# Patient Record
Sex: MALE | Race: WHITE
[De-identification: names, ages, dates, MRNs, and addresses within clinical notes are randomized per-mention and may not be internally consistent; named-entity substitution may affect disease eponyms.]

---

## 2017-05-15 ENCOUNTER — HOSPITAL ENCOUNTER (OUTPATIENT)
Dept: HOSPITAL 83 - LAB | Age: 82
Discharge: HOME | End: 2017-05-15
Attending: FAMILY MEDICINE
Payer: MEDICARE

## 2017-05-15 DIAGNOSIS — E11.9: Primary | ICD-10-CM

## 2017-05-15 DIAGNOSIS — I10: ICD-10-CM

## 2017-05-15 LAB
ALBUMIN SERPL-MCNC: 3.8 GM/DL (ref 3.1–4.5)
ALP SERPL-CCNC: 55 U/L (ref 45–117)
ALT SERPL W P-5'-P-CCNC: 19 U/L (ref 12–78)
AST SERPL-CCNC: 13 IU/L (ref 3–35)
BASOPHILS # BLD AUTO: 0 10*3/UL (ref 0–0.1)
BASOPHILS NFR BLD AUTO: 0.6 % (ref 0–1)
BUN SERPL-MCNC: 17 MG/DL (ref 7–24)
CHLORIDE SERPL-SCNC: 104 MMOL/L (ref 98–107)
CO2 SERPL-SCNC: 34 MMOL/L (ref 21–32)
EOSINOPHIL # BLD AUTO: 0.3 10*3/UL (ref 0–0.4)
EOSINOPHIL # BLD AUTO: 5 % (ref 1–4)
ERYTHROCYTE [DISTWIDTH] IN BLOOD BY AUTOMATED COUNT: 14.4 % (ref 0–14.5)
EST. AVERAGE GLUCOSE BLD GHB EST-MCNC: 140 MG/DL
GLUCOSE SERPL-MCNC: 114 MG/DL (ref 65–99)
HCT VFR BLD AUTO: 44.2 % (ref 42–52)
HGB BLD-MCNC: 14.1 G/DL (ref 14–18)
IG #: 0 10*3/UL (ref 0–0.1)
LYMPHOCYTES # BLD AUTO: 1.3 10*3/UL (ref 1.3–4.4)
LYMPHOCYTES NFR BLD AUTO: 21.4 % (ref 27–41)
MCH RBC QN AUTO: 28.4 PG (ref 27–31)
MCHC RBC AUTO-ENTMCNC: 31.9 G/DL (ref 33–37)
MCV RBC AUTO: 88.9 FL (ref 80–94)
MONOCYTES # BLD AUTO: 0.7 10*3/UL (ref 0.1–1)
MONOCYTES NFR BLD MANUAL: 10.5 % (ref 3–9)
NEUT #: 3.8 10*3/UL (ref 2.3–7.9)
NEUT %: 62.3 % (ref 47–73)
NRBC BLD QL AUTO: 0 % (ref 0–0)
PLATELET # BLD AUTO: 157 10*3/UL (ref 130–400)
PMV BLD AUTO: 10 FL (ref 9.6–12.3)
POTASSIUM SERPL-SCNC: 4.1 MMOL/L (ref 3.5–5.1)
PROT SERPL-MCNC: 7 GM/DL (ref 6.4–8.2)
RBC # BLD AUTO: 4.97 10*6/UL (ref 4.5–5.9)
SODIUM SERPL-SCNC: 143 MMOL/L (ref 136–145)
WBC NRBC COR # BLD AUTO: 6.2 10*3/UL (ref 4.8–10.8)

## 2017-05-19 ENCOUNTER — HOSPITAL ENCOUNTER (OUTPATIENT)
Dept: HOSPITAL 83 - LAB | Age: 82
Discharge: HOME | End: 2017-05-19
Attending: FAMILY MEDICINE
Payer: MEDICARE

## 2017-05-19 DIAGNOSIS — M25.78: ICD-10-CM

## 2017-05-19 DIAGNOSIS — I70.0: ICD-10-CM

## 2017-05-19 DIAGNOSIS — R53.83: ICD-10-CM

## 2017-05-19 DIAGNOSIS — M47.896: Primary | ICD-10-CM

## 2017-05-19 LAB
T4 SERPL-MCNC: 7.5 UG/DL (ref 4.5–12.1)
TSH SERPL DL<=0.005 MIU/L-ACNC: 0.68 UIU/ML (ref 0.36–4.75)

## 2017-07-13 ENCOUNTER — HOSPITAL ENCOUNTER (OUTPATIENT)
Dept: HOSPITAL 83 - MRI | Age: 82
Discharge: HOME | End: 2017-07-13
Attending: FAMILY MEDICINE
Payer: MEDICARE

## 2017-07-13 DIAGNOSIS — M54.16: ICD-10-CM

## 2017-07-13 DIAGNOSIS — M47.897: ICD-10-CM

## 2017-07-13 DIAGNOSIS — M48.06: Primary | ICD-10-CM

## 2017-10-26 ENCOUNTER — HOSPITAL ENCOUNTER (OUTPATIENT)
Dept: HOSPITAL 83 - LAB | Age: 82
Discharge: HOME | End: 2017-10-26
Attending: FAMILY MEDICINE
Payer: MEDICARE

## 2017-10-26 DIAGNOSIS — E78.00: ICD-10-CM

## 2017-10-26 DIAGNOSIS — E11.9: Primary | ICD-10-CM

## 2017-10-26 LAB
ALBUMIN SERPL-MCNC: 3.7 GM/DL (ref 3.1–4.5)
ALP SERPL-CCNC: 66 U/L (ref 45–117)
ALT SERPL W P-5'-P-CCNC: 22 U/L (ref 12–78)
AST SERPL-CCNC: 15 IU/L (ref 3–35)
BASOPHILS # BLD AUTO: 0.1 10*3/UL (ref 0–0.1)
BASOPHILS NFR BLD AUTO: 0.9 % (ref 0–1)
BUN SERPL-MCNC: 14 MG/DL (ref 7–24)
CHLORIDE SERPL-SCNC: 100 MMOL/L (ref 98–107)
CHOLEST SERPL-MCNC: 125 MG/DL (ref ?–200)
CREAT SERPL-MCNC: 0.98 MG/DL (ref 0.7–1.3)
EOSINOPHIL # BLD AUTO: 0.3 10*3/UL (ref 0–0.4)
EOSINOPHIL # BLD AUTO: 4.9 % (ref 1–4)
ERYTHROCYTE [DISTWIDTH] IN BLOOD BY AUTOMATED COUNT: 14.2 % (ref 0–14.5)
HCT VFR BLD AUTO: 45 % (ref 42–52)
HDLC SERPL-MCNC: 38 MG/DL (ref 40–60)
HGB BLD-MCNC: 14.4 G/DL (ref 14–18)
LDLC SERPL DIRECT ASSAY-MCNC: 54 MG/DL (ref 9–159)
LYMPHOCYTES # BLD AUTO: 1.2 10*3/UL (ref 1.3–4.4)
LYMPHOCYTES NFR BLD AUTO: 20 % (ref 27–41)
MCH RBC QN AUTO: 28.9 PG (ref 27–31)
MCHC RBC AUTO-ENTMCNC: 32 G/DL (ref 33–37)
MCV RBC AUTO: 90.4 FL (ref 80–94)
MONOCYTES # BLD AUTO: 0.5 10*3/UL (ref 0.1–1)
MONOCYTES NFR BLD MANUAL: 8.7 % (ref 3–9)
NEUT #: 3.7 10*3/UL (ref 2.3–7.9)
NEUT %: 65 % (ref 47–73)
NRBC BLD QL AUTO: 0 10*3/UL (ref 0–0)
PLATELET # BLD AUTO: 167 10*3/UL (ref 130–400)
PMV BLD AUTO: 10.1 FL (ref 9.6–12.3)
POTASSIUM SERPL-SCNC: 4.7 MMOL/L (ref 3.5–5.1)
PROT SERPL-MCNC: 7.5 GM/DL (ref 6.4–8.2)
RBC # BLD AUTO: 4.98 10*6/UL (ref 4.5–5.9)
SODIUM SERPL-SCNC: 139 MMOL/L (ref 136–145)
TRIGL SERPL-MCNC: 165 MG/DL (ref ?–150)
VLDLC SERPL CALC-MCNC: 33 MG/DL (ref 6–40)
WBC NRBC COR # BLD AUTO: 5.7 10*3/UL (ref 4.8–10.8)

## 2018-01-25 PROBLEM — I35.8 AORTIC VALVE SCLEROSIS: Status: ACTIVE | Noted: 2018-01-25

## 2018-04-20 ENCOUNTER — HOSPITAL ENCOUNTER (OUTPATIENT)
Dept: HOSPITAL 83 - WOUNDCARE | Age: 83
Discharge: HOME | End: 2018-04-20
Attending: NURSE PRACTITIONER
Payer: MEDICARE

## 2018-04-20 DIAGNOSIS — Z87.891: ICD-10-CM

## 2018-04-20 DIAGNOSIS — Z90.49: ICD-10-CM

## 2018-04-20 DIAGNOSIS — E10.622: Primary | ICD-10-CM

## 2018-04-20 DIAGNOSIS — Z98.41: ICD-10-CM

## 2018-04-20 DIAGNOSIS — I10: ICD-10-CM

## 2018-04-20 DIAGNOSIS — L97.311: ICD-10-CM

## 2018-04-20 DIAGNOSIS — E78.00: ICD-10-CM

## 2018-04-20 DIAGNOSIS — Z98.42: ICD-10-CM

## 2018-04-27 ENCOUNTER — HOSPITAL ENCOUNTER (OUTPATIENT)
Dept: HOSPITAL 83 - WOUNDCARE | Age: 83
Discharge: HOME | End: 2018-04-27
Attending: NURSE PRACTITIONER
Payer: MEDICARE

## 2018-04-27 DIAGNOSIS — I10: ICD-10-CM

## 2018-04-27 DIAGNOSIS — Z90.49: ICD-10-CM

## 2018-04-27 DIAGNOSIS — L97.311: ICD-10-CM

## 2018-04-27 DIAGNOSIS — Z87.891: ICD-10-CM

## 2018-04-27 DIAGNOSIS — Z98.41: ICD-10-CM

## 2018-04-27 DIAGNOSIS — E10.622: Primary | ICD-10-CM

## 2018-04-27 DIAGNOSIS — Z98.42: ICD-10-CM

## 2018-04-27 DIAGNOSIS — E78.00: ICD-10-CM

## 2018-05-07 ENCOUNTER — HOSPITAL ENCOUNTER (OUTPATIENT)
Dept: HOSPITAL 83 - WOUNDCARE | Age: 83
Discharge: HOME | End: 2018-05-07
Attending: NURSE PRACTITIONER
Payer: MEDICARE

## 2018-05-07 DIAGNOSIS — Z98.41: ICD-10-CM

## 2018-05-07 DIAGNOSIS — Z98.42: ICD-10-CM

## 2018-05-07 DIAGNOSIS — E10.622: Primary | ICD-10-CM

## 2018-05-07 DIAGNOSIS — I10: ICD-10-CM

## 2018-05-07 DIAGNOSIS — Z90.49: ICD-10-CM

## 2018-05-07 DIAGNOSIS — L97.311: ICD-10-CM

## 2018-05-07 DIAGNOSIS — E78.00: ICD-10-CM

## 2018-05-07 DIAGNOSIS — Z87.891: ICD-10-CM

## 2018-05-14 ENCOUNTER — HOSPITAL ENCOUNTER (OUTPATIENT)
Dept: HOSPITAL 83 - WOUNDCARE | Age: 83
Discharge: HOME | End: 2018-05-14
Attending: NURSE PRACTITIONER
Payer: MEDICARE

## 2018-05-14 DIAGNOSIS — Z90.49: ICD-10-CM

## 2018-05-14 DIAGNOSIS — E10.622: Primary | ICD-10-CM

## 2018-05-14 DIAGNOSIS — Z87.891: ICD-10-CM

## 2018-05-14 DIAGNOSIS — Z98.42: ICD-10-CM

## 2018-05-14 DIAGNOSIS — Z98.41: ICD-10-CM

## 2018-05-14 DIAGNOSIS — E78.00: ICD-10-CM

## 2018-05-14 DIAGNOSIS — I10: ICD-10-CM

## 2018-05-14 DIAGNOSIS — L97.311: ICD-10-CM

## 2018-05-22 ENCOUNTER — HOSPITAL ENCOUNTER (OUTPATIENT)
Dept: HOSPITAL 83 - WOUNDCARE | Age: 83
Discharge: HOME | End: 2018-05-22
Attending: NURSE PRACTITIONER
Payer: MEDICARE

## 2018-05-22 DIAGNOSIS — Z87.891: ICD-10-CM

## 2018-05-22 DIAGNOSIS — E10.622: Primary | ICD-10-CM

## 2018-05-22 DIAGNOSIS — Z98.41: ICD-10-CM

## 2018-05-22 DIAGNOSIS — L97.311: ICD-10-CM

## 2018-05-22 DIAGNOSIS — Z98.42: ICD-10-CM

## 2018-05-22 DIAGNOSIS — Z90.49: ICD-10-CM

## 2018-05-22 DIAGNOSIS — E78.00: ICD-10-CM

## 2018-05-22 DIAGNOSIS — I10: ICD-10-CM

## 2018-05-30 ENCOUNTER — HOSPITAL ENCOUNTER (OUTPATIENT)
Dept: HOSPITAL 83 - WOUNDCARE | Age: 83
Discharge: HOME | End: 2018-05-30
Attending: PODIATRIST
Payer: MEDICARE

## 2018-05-30 DIAGNOSIS — Z98.41: ICD-10-CM

## 2018-05-30 DIAGNOSIS — E10.622: Primary | ICD-10-CM

## 2018-05-30 DIAGNOSIS — I10: ICD-10-CM

## 2018-05-30 DIAGNOSIS — L97.311: ICD-10-CM

## 2018-05-30 DIAGNOSIS — E78.00: ICD-10-CM

## 2018-05-30 DIAGNOSIS — Z98.42: ICD-10-CM

## 2018-05-30 DIAGNOSIS — Z87.891: ICD-10-CM

## 2018-05-30 DIAGNOSIS — Z90.49: ICD-10-CM

## 2018-06-07 ENCOUNTER — HOSPITAL ENCOUNTER (OUTPATIENT)
Dept: HOSPITAL 83 - WOUNDCARE | Age: 83
Discharge: HOME | End: 2018-06-07
Attending: NURSE PRACTITIONER
Payer: MEDICARE

## 2018-06-07 DIAGNOSIS — I10: ICD-10-CM

## 2018-06-07 DIAGNOSIS — E10.59: ICD-10-CM

## 2018-06-07 DIAGNOSIS — E78.00: ICD-10-CM

## 2018-06-07 DIAGNOSIS — L97.311: ICD-10-CM

## 2018-06-07 DIAGNOSIS — Z87.891: ICD-10-CM

## 2018-06-07 DIAGNOSIS — E10.36: ICD-10-CM

## 2018-06-07 DIAGNOSIS — E10.622: Primary | ICD-10-CM

## 2018-06-14 ENCOUNTER — HOSPITAL ENCOUNTER (OUTPATIENT)
Dept: HOSPITAL 83 - WOUNDCARE | Age: 83
Discharge: HOME | End: 2018-06-14
Attending: NURSE PRACTITIONER
Payer: MEDICARE

## 2018-06-14 DIAGNOSIS — E78.00: ICD-10-CM

## 2018-06-14 DIAGNOSIS — Z87.891: ICD-10-CM

## 2018-06-14 DIAGNOSIS — Z90.49: ICD-10-CM

## 2018-06-14 DIAGNOSIS — Z98.42: ICD-10-CM

## 2018-06-14 DIAGNOSIS — L97.311: ICD-10-CM

## 2018-06-14 DIAGNOSIS — E10.622: Primary | ICD-10-CM

## 2018-06-14 DIAGNOSIS — I10: ICD-10-CM

## 2018-06-14 DIAGNOSIS — Z98.41: ICD-10-CM

## 2018-06-21 ENCOUNTER — HOSPITAL ENCOUNTER (OUTPATIENT)
Dept: HOSPITAL 83 - WOUNDCARE | Age: 83
Discharge: HOME | End: 2018-06-21
Attending: NURSE PRACTITIONER
Payer: MEDICARE

## 2018-06-21 DIAGNOSIS — Z95.5: ICD-10-CM

## 2018-06-21 DIAGNOSIS — L97.311: ICD-10-CM

## 2018-06-21 DIAGNOSIS — E11.622: Primary | ICD-10-CM

## 2018-06-21 DIAGNOSIS — E78.00: ICD-10-CM

## 2018-06-21 DIAGNOSIS — Z98.42: ICD-10-CM

## 2018-06-21 DIAGNOSIS — Z87.891: ICD-10-CM

## 2018-06-21 DIAGNOSIS — E11.59: ICD-10-CM

## 2018-06-21 DIAGNOSIS — Z98.41: ICD-10-CM

## 2018-06-21 DIAGNOSIS — I10: ICD-10-CM

## 2018-06-27 ENCOUNTER — HOSPITAL ENCOUNTER (OUTPATIENT)
Dept: HOSPITAL 83 - WOUNDCARE | Age: 83
Discharge: HOME | End: 2018-06-27
Attending: NURSE PRACTITIONER
Payer: MEDICARE

## 2018-06-27 DIAGNOSIS — E11.59: ICD-10-CM

## 2018-06-27 DIAGNOSIS — L97.311: ICD-10-CM

## 2018-06-27 DIAGNOSIS — I10: ICD-10-CM

## 2018-06-27 DIAGNOSIS — Z95.5: ICD-10-CM

## 2018-06-27 DIAGNOSIS — Z98.41: ICD-10-CM

## 2018-06-27 DIAGNOSIS — Z98.42: ICD-10-CM

## 2018-06-27 DIAGNOSIS — E11.622: Primary | ICD-10-CM

## 2018-06-27 DIAGNOSIS — E78.00: ICD-10-CM

## 2018-06-27 DIAGNOSIS — Z87.891: ICD-10-CM

## 2018-07-05 ENCOUNTER — HOSPITAL ENCOUNTER (OUTPATIENT)
Dept: HOSPITAL 83 - WOUNDCARE | Age: 83
Discharge: HOME | End: 2018-07-05
Attending: NURSE PRACTITIONER
Payer: MEDICARE

## 2018-07-05 DIAGNOSIS — I10: ICD-10-CM

## 2018-07-05 DIAGNOSIS — L97.311: ICD-10-CM

## 2018-07-05 DIAGNOSIS — Z90.49: ICD-10-CM

## 2018-07-05 DIAGNOSIS — E78.00: ICD-10-CM

## 2018-07-05 DIAGNOSIS — E10.622: Primary | ICD-10-CM

## 2018-07-05 DIAGNOSIS — Z87.891: ICD-10-CM

## 2018-07-05 DIAGNOSIS — Z98.41: ICD-10-CM

## 2018-07-05 DIAGNOSIS — Z98.42: ICD-10-CM

## 2018-07-09 ENCOUNTER — HOSPITAL ENCOUNTER (OUTPATIENT)
Dept: HOSPITAL 83 - LAB | Age: 83
Discharge: HOME | End: 2018-07-09
Attending: FAMILY MEDICINE
Payer: MEDICARE

## 2018-07-09 DIAGNOSIS — R35.1: ICD-10-CM

## 2018-07-09 DIAGNOSIS — I10: ICD-10-CM

## 2018-07-09 DIAGNOSIS — E11.9: ICD-10-CM

## 2018-07-09 DIAGNOSIS — Z12.5: Primary | ICD-10-CM

## 2018-07-09 LAB
ALBUMIN SERPL-MCNC: 3.8 GM/DL (ref 3.1–4.5)
ALP SERPL-CCNC: 55 U/L (ref 45–117)
ALT SERPL W P-5'-P-CCNC: 17 U/L (ref 12–78)
AST SERPL-CCNC: 10 IU/L (ref 3–35)
BASOPHILS # BLD AUTO: 0.1 10*3/UL (ref 0–0.1)
BASOPHILS NFR BLD AUTO: 1 % (ref 0–1)
BUN SERPL-MCNC: 17 MG/DL (ref 7–24)
CHLORIDE SERPL-SCNC: 107 MMOL/L (ref 98–107)
CHOLEST SERPL-MCNC: 118 MG/DL (ref ?–200)
CREAT SERPL-MCNC: 0.97 MG/DL (ref 0.7–1.3)
EOSINOPHIL # BLD AUTO: 0.4 10*3/UL (ref 0–0.4)
EOSINOPHIL # BLD AUTO: 6.4 % (ref 1–4)
ERYTHROCYTE [DISTWIDTH] IN BLOOD BY AUTOMATED COUNT: 14.2 % (ref 0–14.5)
HCT VFR BLD AUTO: 42.9 % (ref 42–52)
HDLC SERPL-MCNC: 42 MG/DL (ref 40–60)
HGB BLD-MCNC: 13.2 G/DL (ref 14–18)
LDLC SERPL DIRECT ASSAY-MCNC: 58 MG/DL (ref 9–159)
LYMPHOCYTES # BLD AUTO: 1.3 10*3/UL (ref 1.3–4.4)
LYMPHOCYTES NFR BLD AUTO: 20.6 % (ref 27–41)
MCH RBC QN AUTO: 28 PG (ref 27–31)
MCHC RBC AUTO-ENTMCNC: 30.8 G/DL (ref 33–37)
MCV RBC AUTO: 90.9 FL (ref 80–94)
MONOCYTES # BLD AUTO: 0.7 10*3/UL (ref 0.1–1)
MONOCYTES NFR BLD MANUAL: 11.8 % (ref 3–9)
NEUT #: 3.7 10*3/UL (ref 2.3–7.9)
NEUT %: 59.9 % (ref 47–73)
NRBC BLD QL AUTO: 0 10*3/UL (ref 0–0)
PLATELET # BLD AUTO: 154 10*3/UL (ref 130–400)
PMV BLD AUTO: 10.4 FL (ref 9.6–12.3)
POTASSIUM SERPL-SCNC: 4.6 MMOL/L (ref 3.5–5.1)
PROT SERPL-MCNC: 7.3 GM/DL (ref 6.4–8.2)
RBC # BLD AUTO: 4.72 10*6/UL (ref 4.5–5.9)
SODIUM SERPL-SCNC: 144 MMOL/L (ref 136–145)
T4 SERPL-MCNC: 6.8 UG/DL (ref 4.5–12.1)
TRIGL SERPL-MCNC: 90 MG/DL (ref ?–150)
TSH SERPL DL<=0.005 MIU/L-ACNC: 0.84 UIU/ML (ref 0.36–4.75)
VLDLC SERPL CALC-MCNC: 18 MG/DL (ref 6–40)
WBC NRBC COR # BLD AUTO: 6.1 10*3/UL (ref 4.8–10.8)

## 2018-07-20 ENCOUNTER — HOSPITAL ENCOUNTER (OUTPATIENT)
Dept: HOSPITAL 83 - WOUNDCARE | Age: 83
Discharge: HOME | End: 2018-07-20
Attending: NURSE PRACTITIONER
Payer: MEDICARE

## 2018-07-20 DIAGNOSIS — E78.00: ICD-10-CM

## 2018-07-20 DIAGNOSIS — Z98.41: ICD-10-CM

## 2018-07-20 DIAGNOSIS — Z87.891: ICD-10-CM

## 2018-07-20 DIAGNOSIS — I10: ICD-10-CM

## 2018-07-20 DIAGNOSIS — Z90.49: ICD-10-CM

## 2018-07-20 DIAGNOSIS — Z98.42: ICD-10-CM

## 2018-07-20 DIAGNOSIS — L97.311: ICD-10-CM

## 2018-07-20 DIAGNOSIS — E10.622: Primary | ICD-10-CM

## 2018-07-27 ENCOUNTER — HOSPITAL ENCOUNTER (OUTPATIENT)
Dept: HOSPITAL 83 - WOUNDCARE | Age: 83
Discharge: HOME | End: 2018-07-27
Attending: NURSE PRACTITIONER
Payer: MEDICARE

## 2018-07-27 DIAGNOSIS — E10.622: Primary | ICD-10-CM

## 2018-07-27 DIAGNOSIS — Z90.49: ICD-10-CM

## 2018-07-27 DIAGNOSIS — L97.318: ICD-10-CM

## 2018-07-27 DIAGNOSIS — E78.00: ICD-10-CM

## 2018-07-27 DIAGNOSIS — Z98.41: ICD-10-CM

## 2018-07-27 DIAGNOSIS — I10: ICD-10-CM

## 2018-07-27 DIAGNOSIS — Z98.42: ICD-10-CM

## 2018-07-27 DIAGNOSIS — Z87.891: ICD-10-CM

## 2018-10-03 ENCOUNTER — HOSPITAL ENCOUNTER (OUTPATIENT)
Dept: HOSPITAL 83 - LAB | Age: 83
Discharge: HOME | End: 2018-10-03
Attending: FAMILY MEDICINE
Payer: MEDICARE

## 2018-10-03 DIAGNOSIS — E11.9: ICD-10-CM

## 2018-10-03 DIAGNOSIS — I10: Primary | ICD-10-CM

## 2018-10-03 DIAGNOSIS — I25.10: ICD-10-CM

## 2018-10-03 LAB
ALBUMIN SERPL-MCNC: 4 GM/DL (ref 3.1–4.5)
ALP SERPL-CCNC: 55 U/L (ref 45–117)
ALT SERPL W P-5'-P-CCNC: 15 U/L (ref 12–78)
AST SERPL-CCNC: 13 IU/L (ref 3–35)
BASOPHILS # BLD AUTO: 0.1 10*3/UL (ref 0–0.1)
BASOPHILS NFR BLD AUTO: 0.9 % (ref 0–1)
BUN SERPL-MCNC: 18 MG/DL (ref 7–24)
CHLORIDE SERPL-SCNC: 103 MMOL/L (ref 98–107)
CREAT SERPL-MCNC: 0.95 MG/DL (ref 0.7–1.3)
EOSINOPHIL # BLD AUTO: 0.2 10*3/UL (ref 0–0.4)
EOSINOPHIL # BLD AUTO: 2.6 % (ref 1–4)
ERYTHROCYTE [DISTWIDTH] IN BLOOD BY AUTOMATED COUNT: 14.6 % (ref 0–14.5)
HCT VFR BLD AUTO: 41.9 % (ref 42–52)
HGB BLD-MCNC: 13.2 G/DL (ref 14–18)
LYMPHOCYTES # BLD AUTO: 1.3 10*3/UL (ref 1.3–4.4)
LYMPHOCYTES NFR BLD AUTO: 19.7 % (ref 27–41)
MCH RBC QN AUTO: 28.6 PG (ref 27–31)
MCHC RBC AUTO-ENTMCNC: 31.5 G/DL (ref 33–37)
MCV RBC AUTO: 90.7 FL (ref 80–94)
MONOCYTES # BLD AUTO: 0.7 10*3/UL (ref 0.1–1)
MONOCYTES NFR BLD MANUAL: 10.6 % (ref 3–9)
NEUT #: 4.3 10*3/UL (ref 2.3–7.9)
NEUT %: 65.7 % (ref 47–73)
NRBC BLD QL AUTO: 0 10*3/UL (ref 0–0)
PLATELET # BLD AUTO: 159 10*3/UL (ref 130–400)
PMV BLD AUTO: 10.6 FL (ref 9.6–12.3)
POTASSIUM SERPL-SCNC: 4.8 MMOL/L (ref 3.5–5.1)
PROT SERPL-MCNC: 6.8 GM/DL (ref 6.4–8.2)
RBC # BLD AUTO: 4.62 10*6/UL (ref 4.5–5.9)
SODIUM SERPL-SCNC: 140 MMOL/L (ref 136–145)
WBC NRBC COR # BLD AUTO: 6.6 10*3/UL (ref 4.8–10.8)

## 2019-01-17 ENCOUNTER — HOSPITAL ENCOUNTER (OUTPATIENT)
Dept: HOSPITAL 83 - LAB | Age: 84
Discharge: HOME | End: 2019-01-17
Attending: FAMILY MEDICINE
Payer: MEDICARE

## 2019-01-17 DIAGNOSIS — I10: Primary | ICD-10-CM

## 2019-01-17 DIAGNOSIS — E11.9: ICD-10-CM

## 2019-01-17 LAB
ALBUMIN SERPL-MCNC: 3.7 GM/DL (ref 3.1–4.5)
ALP SERPL-CCNC: 57 U/L (ref 45–117)
ALT SERPL W P-5'-P-CCNC: 18 U/L (ref 12–78)
AST SERPL-CCNC: 9 IU/L (ref 3–35)
BASOPHILS # BLD AUTO: 0.1 10*3/UL (ref 0–0.1)
BASOPHILS NFR BLD AUTO: 0.8 % (ref 0–1)
BUN SERPL-MCNC: 16 MG/DL (ref 7–24)
CHLORIDE SERPL-SCNC: 104 MMOL/L (ref 98–107)
CREAT SERPL-MCNC: 0.95 MG/DL (ref 0.7–1.3)
EOSINOPHIL # BLD AUTO: 0.5 10*3/UL (ref 0–0.4)
EOSINOPHIL # BLD AUTO: 7.6 % (ref 1–4)
ERYTHROCYTE [DISTWIDTH] IN BLOOD BY AUTOMATED COUNT: 14 % (ref 0–14.5)
HCT VFR BLD AUTO: 44.1 % (ref 42–52)
HGB BLD-MCNC: 13.6 G/DL (ref 14–18)
LYMPHOCYTES # BLD AUTO: 1 10*3/UL (ref 1.3–4.4)
LYMPHOCYTES NFR BLD AUTO: 17.3 % (ref 27–41)
MCH RBC QN AUTO: 28.5 PG (ref 27–31)
MCHC RBC AUTO-ENTMCNC: 30.8 G/DL (ref 33–37)
MCV RBC AUTO: 92.3 FL (ref 80–94)
MONOCYTES # BLD AUTO: 0.6 10*3/UL (ref 0.1–1)
MONOCYTES NFR BLD MANUAL: 9.7 % (ref 3–9)
NEUT #: 3.8 10*3/UL (ref 2.3–7.9)
NEUT %: 64.1 % (ref 47–73)
NRBC BLD QL AUTO: 0 % (ref 0–0)
PLATELET # BLD AUTO: 148 10*3/UL (ref 130–400)
PMV BLD AUTO: 10.3 FL (ref 9.6–12.3)
POTASSIUM SERPL-SCNC: 4.5 MMOL/L (ref 3.5–5.1)
PROT SERPL-MCNC: 6.8 GM/DL (ref 6.4–8.2)
RBC # BLD AUTO: 4.78 10*6/UL (ref 4.5–5.9)
SODIUM SERPL-SCNC: 141 MMOL/L (ref 136–145)
WBC NRBC COR # BLD AUTO: 6 10*3/UL (ref 4.8–10.8)

## 2019-07-22 ENCOUNTER — HOSPITAL ENCOUNTER (OUTPATIENT)
Dept: HOSPITAL 83 - LAB | Age: 84
Discharge: HOME | End: 2019-07-22
Attending: FAMILY MEDICINE
Payer: MEDICARE

## 2019-07-22 DIAGNOSIS — E11.9: Primary | ICD-10-CM

## 2019-07-22 DIAGNOSIS — I10: ICD-10-CM

## 2019-07-22 LAB
ALBUMIN SERPL-MCNC: 3.6 GM/DL (ref 3.1–4.5)
ALP SERPL-CCNC: 59 U/L (ref 45–117)
ALT SERPL W P-5'-P-CCNC: 13 U/L (ref 12–78)
AST SERPL-CCNC: 10 IU/L (ref 3–35)
BASOPHILS # BLD AUTO: 0.1 10*3/UL (ref 0–0.1)
BASOPHILS NFR BLD AUTO: 1.2 % (ref 0–1)
BUN SERPL-MCNC: 19 MG/DL (ref 7–24)
CHLORIDE SERPL-SCNC: 106 MMOL/L (ref 98–107)
CREAT SERPL-MCNC: 0.94 MG/DL (ref 0.7–1.3)
EOSINOPHIL # BLD AUTO: 0.3 10*3/UL (ref 0–0.4)
EOSINOPHIL # BLD AUTO: 6.1 % (ref 1–4)
ERYTHROCYTE [DISTWIDTH] IN BLOOD BY AUTOMATED COUNT: 16.8 % (ref 0–14.5)
HCT VFR BLD AUTO: 35.2 % (ref 42–52)
HGB BLD-MCNC: 10.5 G/DL (ref 14–18)
LYMPHOCYTES # BLD AUTO: 1.2 10*3/UL (ref 1.3–4.4)
LYMPHOCYTES NFR BLD AUTO: 22.1 % (ref 27–41)
MCH RBC QN AUTO: 24.8 PG (ref 27–31)
MCHC RBC AUTO-ENTMCNC: 29.8 G/DL (ref 33–37)
MCV RBC AUTO: 83.2 FL (ref 80–94)
MONOCYTES # BLD AUTO: 0.6 10*3/UL (ref 0.1–1)
MONOCYTES NFR BLD MANUAL: 11.9 % (ref 3–9)
NEUT #: 3.1 10*3/UL (ref 2.3–7.9)
NEUT %: 58.5 % (ref 47–73)
NRBC BLD QL AUTO: 0 % (ref 0–0)
PLATELET # BLD AUTO: 162 10*3/UL (ref 130–400)
PMV BLD AUTO: 11.2 FL (ref 9.6–12.3)
POTASSIUM SERPL-SCNC: 4.1 MMOL/L (ref 3.5–5.1)
PROT SERPL-MCNC: 6.8 GM/DL (ref 6.4–8.2)
RBC # BLD AUTO: 4.23 10*6/UL (ref 4.5–5.9)
SODIUM SERPL-SCNC: 142 MMOL/L (ref 136–145)
WBC NRBC COR # BLD AUTO: 5.2 10*3/UL (ref 4.8–10.8)

## 2019-07-25 ENCOUNTER — HOSPITAL ENCOUNTER (OUTPATIENT)
Dept: HOSPITAL 83 - LAB | Age: 84
Discharge: HOME | End: 2019-07-25
Attending: FAMILY MEDICINE
Payer: MEDICARE

## 2019-07-25 DIAGNOSIS — D64.9: Primary | ICD-10-CM

## 2019-07-25 LAB
BASOPHILS # BLD AUTO: 0.1 10*3/UL (ref 0–0.1)
BASOPHILS NFR BLD AUTO: 1 % (ref 0–1)
EOSINOPHIL # BLD AUTO: 0.4 10*3/UL (ref 0–0.4)
EOSINOPHIL # BLD AUTO: 7 % (ref 1–4)
ERYTHROCYTE [DISTWIDTH] IN BLOOD BY AUTOMATED COUNT: 16.6 % (ref 0–14.5)
HCT VFR BLD AUTO: 36.1 % (ref 42–52)
HGB BLD-MCNC: 10.7 G/DL (ref 14–18)
IRON SERPL-MCNC: 32 UG/DL (ref 65–175)
LYMPHOCYTES # BLD AUTO: 1.2 10*3/UL (ref 1.3–4.4)
LYMPHOCYTES NFR BLD AUTO: 19.5 % (ref 27–41)
MCH RBC QN AUTO: 24.7 PG (ref 27–31)
MCHC RBC AUTO-ENTMCNC: 29.6 G/DL (ref 33–37)
MCV RBC AUTO: 83.2 FL (ref 80–94)
MONOCYTES # BLD AUTO: 0.7 10*3/UL (ref 0.1–1)
MONOCYTES NFR BLD MANUAL: 11.7 % (ref 3–9)
NEUT #: 3.6 10*3/UL (ref 2.3–7.9)
NEUT %: 60.5 % (ref 47–73)
NRBC BLD QL AUTO: 0 % (ref 0–0)
PLATELET # BLD AUTO: 165 10*3/UL (ref 130–400)
PMV BLD AUTO: 10.7 FL (ref 9.6–12.3)
RBC # BLD AUTO: 4.34 10*6/UL (ref 4.5–5.9)
TIBC SERPL-MCNC: 454 UG/DL (ref 250–450)
WBC NRBC COR # BLD AUTO: 5.9 10*3/UL (ref 4.8–10.8)

## 2019-08-22 ENCOUNTER — OFFICE VISIT (OUTPATIENT)
Dept: CARDIOLOGY CLINIC | Age: 84
End: 2019-08-22
Payer: MEDICARE

## 2019-08-22 VITALS
HEIGHT: 64 IN | BODY MASS INDEX: 27.31 KG/M2 | HEART RATE: 70 BPM | DIASTOLIC BLOOD PRESSURE: 68 MMHG | RESPIRATION RATE: 18 BRPM | WEIGHT: 160 LBS | SYSTOLIC BLOOD PRESSURE: 110 MMHG

## 2019-08-22 DIAGNOSIS — I45.10 RBBB (RIGHT BUNDLE BRANCH BLOCK): ICD-10-CM

## 2019-08-22 DIAGNOSIS — I49.3 PVC'S (PREMATURE VENTRICULAR CONTRACTIONS): ICD-10-CM

## 2019-08-22 DIAGNOSIS — R94.31 ABNORMAL EKG: ICD-10-CM

## 2019-08-22 DIAGNOSIS — I10 ESSENTIAL HYPERTENSION: Primary | ICD-10-CM

## 2019-08-22 DIAGNOSIS — I25.10 CORONARY ARTERY DISEASE INVOLVING NATIVE CORONARY ARTERY OF NATIVE HEART WITHOUT ANGINA PECTORIS: ICD-10-CM

## 2019-08-22 PROCEDURE — G8427 DOCREV CUR MEDS BY ELIG CLIN: HCPCS | Performed by: INTERNAL MEDICINE

## 2019-08-22 PROCEDURE — 1123F ACP DISCUSS/DSCN MKR DOCD: CPT | Performed by: INTERNAL MEDICINE

## 2019-08-22 PROCEDURE — G8419 CALC BMI OUT NRM PARAM NOF/U: HCPCS | Performed by: INTERNAL MEDICINE

## 2019-08-22 PROCEDURE — 93000 ELECTROCARDIOGRAM COMPLETE: CPT | Performed by: INTERNAL MEDICINE

## 2019-08-22 PROCEDURE — G8598 ASA/ANTIPLAT THER USED: HCPCS | Performed by: INTERNAL MEDICINE

## 2019-08-22 PROCEDURE — 1036F TOBACCO NON-USER: CPT | Performed by: INTERNAL MEDICINE

## 2019-08-22 PROCEDURE — 4040F PNEUMOC VAC/ADMIN/RCVD: CPT | Performed by: INTERNAL MEDICINE

## 2019-08-22 PROCEDURE — 99214 OFFICE O/P EST MOD 30 MIN: CPT | Performed by: INTERNAL MEDICINE

## 2019-08-22 RX ORDER — FERROUS FUMARATE/ASCORBIC ACID 65MG-25 MG
1 TABLET, EXTENDED RELEASE ORAL 2 TIMES DAILY
Refills: 5 | COMMUNITY
Start: 2019-07-25 | End: 2021-05-27

## 2019-08-28 ENCOUNTER — HOSPITAL ENCOUNTER (OUTPATIENT)
Dept: HOSPITAL 83 - CARD | Age: 84
Discharge: HOME | End: 2019-08-28
Attending: INTERNAL MEDICINE
Payer: MEDICARE

## 2019-08-28 DIAGNOSIS — R94.31: ICD-10-CM

## 2019-08-28 DIAGNOSIS — I10: ICD-10-CM

## 2019-08-28 DIAGNOSIS — R00.0: Primary | ICD-10-CM

## 2019-08-28 DIAGNOSIS — I25.10: ICD-10-CM

## 2019-08-28 LAB
LV EF: 63 %
LVEF MODALITY: NORMAL

## 2019-08-28 NOTE — NUR
INFORMED CONSENT OBTAINED FOR A WALKING LEXISCAN AFTER BEING EVALUATED BY DR CRISOSTOMO. RESTING EKG SINUS MIKKI WITH PAC'S. HT RT OF 61, WITH A BP /64.
POX 96% VIA RA WITH CLEAR BREATH SOUNDS.
COMPLETED 3:00 OF A WALKING LEXISCAN AT 1.4 MPH AND ZERO GRADE. RECEIVED
LEXISCAN 0.4 MG OVER 10 SECONDS. WITH SOB NOTED THAT WAS RELIEVED IN RECOVERY.
HAD A PEAK HT RT , WITH A BP /64.
LAST RECOVERY HT RT , WITH A BP /60.
AWIAING NUCLEAR IMAGING IN STABLE CONDITION.

## 2019-08-28 NOTE — ST
[place], [place]
 
                               EXERCISE STRESS TEST REPORT
 
        NAME: INDY HAWKINS                ACCT #: [id number]  
        UNIT #: [id number]                        ROOM:           
        DOCTOR: LYLA CRISOSTOMO                    BIRTHDATE: [birth year]
 
 
DOS: 08/28/2019
 
INDICATION:  Abnormal EKG, history of coronary artery disease.
 
PROTOCOL:  Walking regadenoson SPECT myocardial perfusion imaging.
 
Baseline EKG showed sinus rhythm with right bundle branch block.  Otherwise,
normal axis, no ST or T-wave abnormalities.  Baseline pulse 63 with a resting
blood pressure 130/64.  At low intensity exercise (1.4 miles per hour with a 0%
grade) 0.4 mg of regadenoson was injected per protocol followed by radioisotope
injection.  The patient reported no chest pain.  Stress EKG did demonstrate
about 1 mm of horizontal ST depression in V2 and V3 suggestive of possible
ischemia.  He also became tachycardic, which predominantly looks like sinus
tachycardia with frequent PACs.
 
IMPRESSION:
1.  Stress EKG suggestive of ischemia.
2.  Sinus tachycardia with PACs.
3.  Nuclear images to be reported separately.
4.  Baseline EKG, sinus rhythm with right bundle branch block.
 
 
 
_________________________________
Dr. LYLA CRISOSTOMO MD
 
CM:STRESS:EXERCISE STRESS TEST REPORT 
 
 
D: 08/28/19 1039
T: 08/28/19 1539
    
                                                            
LYLA CRISOSTOMO

## 2019-08-29 DIAGNOSIS — I25.10 CORONARY ARTERY DISEASE INVOLVING NATIVE CORONARY ARTERY OF NATIVE HEART WITHOUT ANGINA PECTORIS: ICD-10-CM

## 2019-08-29 DIAGNOSIS — I10 ESSENTIAL HYPERTENSION: ICD-10-CM

## 2019-08-29 DIAGNOSIS — R94.31 ABNORMAL EKG: ICD-10-CM

## 2019-08-29 DIAGNOSIS — I49.3 PVC'S (PREMATURE VENTRICULAR CONTRACTIONS): ICD-10-CM

## 2019-09-13 ENCOUNTER — HOSPITAL ENCOUNTER (OUTPATIENT)
Dept: HOSPITAL 83 - LAB | Age: 84
Discharge: HOME | End: 2019-09-13
Attending: FAMILY MEDICINE
Payer: MEDICARE

## 2019-09-13 DIAGNOSIS — D64.9: Primary | ICD-10-CM

## 2019-09-13 DIAGNOSIS — E61.1: ICD-10-CM

## 2019-10-10 ENCOUNTER — OFFICE VISIT (OUTPATIENT)
Dept: CARDIOLOGY CLINIC | Age: 84
End: 2019-10-10
Payer: MEDICARE

## 2019-10-10 ENCOUNTER — HOSPITAL ENCOUNTER (OUTPATIENT)
Dept: HOSPITAL 83 - LAB | Age: 84
Discharge: HOME | End: 2019-10-10
Attending: FAMILY MEDICINE
Payer: MEDICARE

## 2019-10-10 VITALS
SYSTOLIC BLOOD PRESSURE: 112 MMHG | HEART RATE: 65 BPM | BODY MASS INDEX: 26.98 KG/M2 | HEIGHT: 64 IN | DIASTOLIC BLOOD PRESSURE: 64 MMHG | RESPIRATION RATE: 18 BRPM | WEIGHT: 158 LBS

## 2019-10-10 DIAGNOSIS — D64.9: Primary | ICD-10-CM

## 2019-10-10 DIAGNOSIS — I10 ESSENTIAL HYPERTENSION: Primary | ICD-10-CM

## 2019-10-10 LAB
ALBUMIN SERPL-MCNC: 3.9 GM/DL (ref 3.1–4.5)
ALP SERPL-CCNC: 54 U/L (ref 45–117)
ALT SERPL W P-5'-P-CCNC: 29 U/L (ref 12–78)
AST SERPL-CCNC: 22 IU/L (ref 3–35)
BASOPHILS # BLD AUTO: 0.1 10*3/UL (ref 0–0.1)
BASOPHILS NFR BLD AUTO: 0.9 % (ref 0–1)
BUN SERPL-MCNC: 20 MG/DL (ref 7–24)
CHLORIDE SERPL-SCNC: 102 MMOL/L (ref 98–107)
CREAT SERPL-MCNC: 0.92 MG/DL (ref 0.7–1.3)
EOSINOPHIL # BLD AUTO: 0.2 10*3/UL (ref 0–0.4)
EOSINOPHIL # BLD AUTO: 3.7 % (ref 1–4)
ERYTHROCYTE [DISTWIDTH] IN BLOOD BY AUTOMATED COUNT: 17.8 % (ref 0–14.5)
HCT VFR BLD AUTO: 43.3 % (ref 42–52)
HGB BLD-MCNC: 13.4 G/DL (ref 14–18)
IRON SERPL-MCNC: 72 UG/DL (ref 65–175)
LYMPHOCYTES # BLD AUTO: 1 10*3/UL (ref 1.3–4.4)
LYMPHOCYTES NFR BLD AUTO: 18.5 % (ref 27–41)
MCH RBC QN AUTO: 27.5 PG (ref 27–31)
MCHC RBC AUTO-ENTMCNC: 30.9 G/DL (ref 33–37)
MCV RBC AUTO: 88.7 FL (ref 80–94)
MONOCYTES # BLD AUTO: 0.7 10*3/UL (ref 0.1–1)
MONOCYTES NFR BLD MANUAL: 12.8 % (ref 3–9)
NEUT #: 3.6 10*3/UL (ref 2.3–7.9)
NEUT %: 63.6 % (ref 47–73)
NRBC BLD QL AUTO: 0 10*3/UL (ref 0–0)
PLATELET # BLD AUTO: 154 10*3/UL (ref 130–400)
PMV BLD AUTO: 11 FL (ref 9.6–12.3)
POTASSIUM SERPL-SCNC: 4.8 MMOL/L (ref 3.5–5.1)
PROT SERPL-MCNC: 7.3 GM/DL (ref 6.4–8.2)
RBC # BLD AUTO: 4.88 10*6/UL (ref 4.5–5.9)
SODIUM SERPL-SCNC: 140 MMOL/L (ref 136–145)
TIBC SERPL-MCNC: 356 UG/DL (ref 250–450)
WBC NRBC COR # BLD AUTO: 5.6 10*3/UL (ref 4.8–10.8)

## 2019-10-10 PROCEDURE — 1036F TOBACCO NON-USER: CPT | Performed by: INTERNAL MEDICINE

## 2019-10-10 PROCEDURE — G8484 FLU IMMUNIZE NO ADMIN: HCPCS | Performed by: INTERNAL MEDICINE

## 2019-10-10 PROCEDURE — 4040F PNEUMOC VAC/ADMIN/RCVD: CPT | Performed by: INTERNAL MEDICINE

## 2019-10-10 PROCEDURE — 1123F ACP DISCUSS/DSCN MKR DOCD: CPT | Performed by: INTERNAL MEDICINE

## 2019-10-10 PROCEDURE — G8419 CALC BMI OUT NRM PARAM NOF/U: HCPCS | Performed by: INTERNAL MEDICINE

## 2019-10-10 PROCEDURE — 93000 ELECTROCARDIOGRAM COMPLETE: CPT | Performed by: INTERNAL MEDICINE

## 2019-10-10 PROCEDURE — 99214 OFFICE O/P EST MOD 30 MIN: CPT | Performed by: INTERNAL MEDICINE

## 2019-10-10 PROCEDURE — G8598 ASA/ANTIPLAT THER USED: HCPCS | Performed by: INTERNAL MEDICINE

## 2019-10-10 PROCEDURE — G8427 DOCREV CUR MEDS BY ELIG CLIN: HCPCS | Performed by: INTERNAL MEDICINE

## 2020-01-30 ENCOUNTER — HOSPITAL ENCOUNTER (OUTPATIENT)
Dept: HOSPITAL 83 - LAB | Age: 85
Discharge: HOME | End: 2020-01-30
Attending: STUDENT IN AN ORGANIZED HEALTH CARE EDUCATION/TRAINING PROGRAM
Payer: MEDICARE

## 2020-01-30 DIAGNOSIS — I10: ICD-10-CM

## 2020-01-30 DIAGNOSIS — Z12.5: Primary | ICD-10-CM

## 2020-01-30 DIAGNOSIS — D64.9: ICD-10-CM

## 2020-01-30 DIAGNOSIS — Z79.899: ICD-10-CM

## 2020-01-30 DIAGNOSIS — E11.9: ICD-10-CM

## 2020-01-30 LAB
ALBUMIN SERPL-MCNC: 4 GM/DL (ref 3.1–4.5)
ALP SERPL-CCNC: 69 U/L (ref 45–117)
ALT SERPL W P-5'-P-CCNC: 20 U/L (ref 12–78)
AST SERPL-CCNC: 11 IU/L (ref 3–35)
BASOPHILS # BLD AUTO: 0.1 10*3/UL (ref 0–0.1)
BASOPHILS NFR BLD AUTO: 1.1 % (ref 0–1)
BUN SERPL-MCNC: 16 MG/DL (ref 7–24)
CHLORIDE SERPL-SCNC: 104 MMOL/L (ref 98–107)
CHOLEST SERPL-MCNC: 137 MG/DL (ref ?–200)
CREAT SERPL-MCNC: 1.04 MG/DL (ref 0.7–1.3)
EOSINOPHIL # BLD AUTO: 0.3 10*3/UL (ref 0–0.4)
EOSINOPHIL # BLD AUTO: 5 % (ref 1–4)
ERYTHROCYTE [DISTWIDTH] IN BLOOD BY AUTOMATED COUNT: 14 % (ref 0–14.5)
HCT VFR BLD AUTO: 46.8 % (ref 42–52)
HDLC SERPL-MCNC: 46 MG/DL (ref 40–60)
HGB BLD-MCNC: 14.3 G/DL (ref 14–18)
IRON SERPL-MCNC: 77 UG/DL (ref 65–175)
LDLC SERPL DIRECT ASSAY-MCNC: 63 MG/DL (ref 9–159)
LYMPHOCYTES # BLD AUTO: 1.1 10*3/UL (ref 1.3–4.4)
LYMPHOCYTES NFR BLD AUTO: 17.5 % (ref 27–41)
MCH RBC QN AUTO: 28.4 PG (ref 27–31)
MCHC RBC AUTO-ENTMCNC: 30.6 G/DL (ref 33–37)
MCV RBC AUTO: 93 FL (ref 80–94)
MONOCYTES # BLD AUTO: 0.6 10*3/UL (ref 0.1–1)
MONOCYTES NFR BLD MANUAL: 10.1 % (ref 3–9)
NEUT #: 4.2 10*3/UL (ref 2.3–7.9)
NEUT %: 65.8 % (ref 47–73)
NRBC BLD QL AUTO: 0 % (ref 0–0)
PLATELET # BLD AUTO: 157 10*3/UL (ref 130–400)
PMV BLD AUTO: 10.3 FL (ref 9.6–12.3)
POTASSIUM SERPL-SCNC: 4.2 MMOL/L (ref 3.5–5.1)
PROT SERPL-MCNC: 7.4 GM/DL (ref 6.4–8.2)
RBC # BLD AUTO: 5.03 10*6/UL (ref 4.5–5.9)
SODIUM SERPL-SCNC: 141 MMOL/L (ref 136–145)
T4 SERPL-MCNC: 7.5 UG/DL (ref 4.5–12.1)
TRIGL SERPL-MCNC: 138 MG/DL (ref ?–150)
TSH SERPL DL<=0.005 MIU/L-ACNC: 0.83 UIU/ML (ref 0.36–4.75)
VLDLC SERPL CALC-MCNC: 28 MG/DL (ref 6–40)
WBC NRBC COR # BLD AUTO: 6.4 10*3/UL (ref 4.8–10.8)

## 2020-07-08 ENCOUNTER — HOSPITAL ENCOUNTER (OUTPATIENT)
Dept: HOSPITAL 83 - LAB | Age: 85
Discharge: HOME | End: 2020-07-08
Attending: FAMILY MEDICINE
Payer: MEDICARE

## 2020-07-08 DIAGNOSIS — E11.9: Primary | ICD-10-CM

## 2020-07-08 DIAGNOSIS — I10: ICD-10-CM

## 2020-07-08 LAB
ALBUMIN SERPL-MCNC: 3.7 GM/DL (ref 3.1–4.5)
ALP SERPL-CCNC: 70 U/L (ref 45–117)
ALT SERPL W P-5'-P-CCNC: 13 U/L (ref 12–78)
AST SERPL-CCNC: 7 IU/L (ref 3–35)
BASOPHILS # BLD AUTO: 0.1 10*3/UL (ref 0–0.1)
BASOPHILS NFR BLD AUTO: 1.2 % (ref 0–1)
BUN SERPL-MCNC: 16 MG/DL (ref 7–24)
CHLORIDE SERPL-SCNC: 104 MMOL/L (ref 98–107)
CREAT SERPL-MCNC: 1.07 MG/DL (ref 0.7–1.3)
EOSINOPHIL # BLD AUTO: 0.4 10*3/UL (ref 0–0.4)
EOSINOPHIL # BLD AUTO: 6.8 % (ref 1–4)
ERYTHROCYTE [DISTWIDTH] IN BLOOD BY AUTOMATED COUNT: 14.3 % (ref 0–14.5)
HCT VFR BLD AUTO: 42.1 % (ref 42–52)
LYMPHOCYTES # BLD AUTO: 1.1 10*3/UL (ref 1.3–4.4)
LYMPHOCYTES NFR BLD AUTO: 20.3 % (ref 27–41)
MCH RBC QN AUTO: 28.3 PG (ref 27–31)
MCHC RBC AUTO-ENTMCNC: 30.9 G/DL (ref 33–37)
MCV RBC AUTO: 91.5 FL (ref 80–94)
MONOCYTES # BLD AUTO: 0.5 10*3/UL (ref 0.1–1)
MONOCYTES NFR BLD MANUAL: 10 % (ref 3–9)
NEUT #: 3.2 10*3/UL (ref 2.3–7.9)
NEUT %: 61.5 % (ref 47–73)
NRBC BLD QL AUTO: 0 % (ref 0–0)
PLATELET # BLD AUTO: 158 10*3/UL (ref 130–400)
PMV BLD AUTO: 10.7 FL (ref 9.6–12.3)
POTASSIUM SERPL-SCNC: 4 MMOL/L (ref 3.5–5.1)
PROT SERPL-MCNC: 7.1 GM/DL (ref 6.4–8.2)
RBC # BLD AUTO: 4.6 10*6/UL (ref 4.5–5.9)
SODIUM SERPL-SCNC: 139 MMOL/L (ref 136–145)
WBC NRBC COR # BLD AUTO: 5.2 10*3/UL (ref 4.8–10.8)

## 2020-09-24 ENCOUNTER — HOSPITAL ENCOUNTER (OUTPATIENT)
Dept: HOSPITAL 83 - LAB | Age: 85
Discharge: HOME | End: 2020-09-24
Attending: FAMILY MEDICINE
Payer: MEDICARE

## 2020-09-24 DIAGNOSIS — R53.83: ICD-10-CM

## 2020-09-24 DIAGNOSIS — I10: ICD-10-CM

## 2020-09-24 DIAGNOSIS — E11.9: Primary | ICD-10-CM

## 2020-09-24 LAB
ALBUMIN SERPL-MCNC: 3.7 GM/DL (ref 3.1–4.5)
ALP SERPL-CCNC: 79 U/L (ref 45–117)
ALT SERPL W P-5'-P-CCNC: 18 U/L (ref 12–78)
AST SERPL-CCNC: 12 IU/L (ref 3–35)
BASOPHILS # BLD AUTO: 0.1 10*3/UL (ref 0–0.1)
BASOPHILS NFR BLD AUTO: 0.7 % (ref 0–1)
BUN SERPL-MCNC: 16 MG/DL (ref 7–24)
CHLORIDE SERPL-SCNC: 106 MMOL/L (ref 98–107)
CREAT SERPL-MCNC: 0.95 MG/DL (ref 0.7–1.3)
EOSINOPHIL # BLD AUTO: 0.4 10*3/UL (ref 0–0.4)
EOSINOPHIL # BLD AUTO: 5.4 % (ref 1–4)
ERYTHROCYTE [DISTWIDTH] IN BLOOD BY AUTOMATED COUNT: 14.7 % (ref 0–14.5)
HCT VFR BLD AUTO: 40.7 % (ref 42–52)
LYMPHOCYTES # BLD AUTO: 1.2 10*3/UL (ref 1.3–4.4)
LYMPHOCYTES NFR BLD AUTO: 17.2 % (ref 27–41)
MCH RBC QN AUTO: 27.8 PG (ref 27–31)
MCHC RBC AUTO-ENTMCNC: 31 G/DL (ref 33–37)
MCV RBC AUTO: 89.8 FL (ref 80–94)
MONOCYTES # BLD AUTO: 0.8 10*3/UL (ref 0.1–1)
MONOCYTES NFR BLD MANUAL: 12 % (ref 3–9)
NEUT #: 4.4 10*3/UL (ref 2.3–7.9)
NEUT %: 64.4 % (ref 47–73)
NRBC BLD QL AUTO: 0 % (ref 0–0)
PLATELET # BLD AUTO: 148 10*3/UL (ref 130–400)
PMV BLD AUTO: 11.5 FL (ref 9.6–12.3)
POTASSIUM SERPL-SCNC: 4.4 MMOL/L (ref 3.5–5.1)
PROT SERPL-MCNC: 6.9 GM/DL (ref 6.4–8.2)
RBC # BLD AUTO: 4.53 10*6/UL (ref 4.5–5.9)
SODIUM SERPL-SCNC: 140 MMOL/L (ref 136–145)
WBC NRBC COR # BLD AUTO: 6.9 10*3/UL (ref 4.8–10.8)

## 2020-12-03 ENCOUNTER — HOSPITAL ENCOUNTER (OUTPATIENT)
Dept: HOSPITAL 83 - COVID19 | Age: 85
Discharge: HOME | End: 2020-12-03
Attending: STUDENT IN AN ORGANIZED HEALTH CARE EDUCATION/TRAINING PROGRAM
Payer: MEDICARE

## 2020-12-03 DIAGNOSIS — Z20.828: Primary | ICD-10-CM

## 2021-04-30 LAB
LV EF: 47 %
LVEF MODALITY: NORMAL

## 2021-05-02 LAB
BUN BLDV-MCNC: 17 MG/DL (ref 7–24)
CALCIUM SERPL-MCNC: 8.5 MG/DL (ref 8.5–10.5)
CHLORIDE BLD-SCNC: 92 MMOL/L (ref 98–107)
CO2: 43 MMOL/L (ref 21–32)
CREAT SERPL-MCNC: 1.18 MG/DL (ref 0.7–1.3)
GFR AFRICAN AMERICAN: > 60 ML/MIN
GFR SERPL CREATININE-BSD FRML MDRD: 59 ML/MIN/
GLUCOSE: 139 MG/DL (ref 65–99)
MAGNESIUM: 1.9 MG/DL (ref 1.5–2.1)
POTASSIUM SERPL-SCNC: 3.6 MMOL/L (ref 3.5–5.1)
SODIUM BLD-SCNC: 137 MMOL/L (ref 136–145)

## 2021-05-26 RX ORDER — SPIRONOLACTONE 25 MG/1
12.5 TABLET ORAL DAILY
COMMUNITY
Start: 2021-05-01 | End: 2021-07-20 | Stop reason: SDUPTHER

## 2021-05-26 RX ORDER — LOSARTAN POTASSIUM 25 MG/1
12.5 TABLET ORAL DAILY
COMMUNITY
Start: 2021-05-01

## 2021-05-26 RX ORDER — FUROSEMIDE 40 MG/1
1 TABLET ORAL DAILY
COMMUNITY
Start: 2021-05-01

## 2021-05-26 RX ORDER — METOPROLOL SUCCINATE 50 MG/1
1 TABLET, EXTENDED RELEASE ORAL DAILY
COMMUNITY
Start: 2021-05-01 | End: 2021-07-20 | Stop reason: DRUGHIGH

## 2021-05-27 ENCOUNTER — TELEPHONE (OUTPATIENT)
Dept: CARDIOLOGY CLINIC | Age: 86
End: 2021-05-27

## 2021-05-27 ENCOUNTER — OFFICE VISIT (OUTPATIENT)
Dept: CARDIOLOGY CLINIC | Age: 86
End: 2021-05-27
Payer: MEDICARE

## 2021-05-27 VITALS
SYSTOLIC BLOOD PRESSURE: 108 MMHG | WEIGHT: 139 LBS | BODY MASS INDEX: 23.73 KG/M2 | RESPIRATION RATE: 18 BRPM | DIASTOLIC BLOOD PRESSURE: 56 MMHG | HEART RATE: 88 BPM | HEIGHT: 64 IN

## 2021-05-27 DIAGNOSIS — I50.22 CHRONIC SYSTOLIC CONGESTIVE HEART FAILURE (HCC): Primary | ICD-10-CM

## 2021-05-27 DIAGNOSIS — I10 ESSENTIAL HYPERTENSION: Primary | ICD-10-CM

## 2021-05-27 PROCEDURE — 1036F TOBACCO NON-USER: CPT | Performed by: INTERNAL MEDICINE

## 2021-05-27 PROCEDURE — 1123F ACP DISCUSS/DSCN MKR DOCD: CPT | Performed by: INTERNAL MEDICINE

## 2021-05-27 PROCEDURE — 93000 ELECTROCARDIOGRAM COMPLETE: CPT | Performed by: INTERNAL MEDICINE

## 2021-05-27 PROCEDURE — 99214 OFFICE O/P EST MOD 30 MIN: CPT | Performed by: INTERNAL MEDICINE

## 2021-05-27 PROCEDURE — G8427 DOCREV CUR MEDS BY ELIG CLIN: HCPCS | Performed by: INTERNAL MEDICINE

## 2021-05-27 PROCEDURE — G8420 CALC BMI NORM PARAMETERS: HCPCS | Performed by: INTERNAL MEDICINE

## 2021-05-27 PROCEDURE — 4040F PNEUMOC VAC/ADMIN/RCVD: CPT | Performed by: INTERNAL MEDICINE

## 2021-05-27 RX ORDER — POTASSIUM CHLORIDE 1500 MG/1
20 TABLET, FILM COATED, EXTENDED RELEASE ORAL
COMMUNITY

## 2021-05-27 RX ORDER — POLYETHYLENE GLYCOL 3350 17 G/17G
17 POWDER, FOR SOLUTION ORAL DAILY PRN
COMMUNITY

## 2021-05-27 RX ORDER — PERPHENAZINE 16 MG
TABLET ORAL
COMMUNITY
End: 2021-07-14

## 2021-05-27 RX ORDER — MELATONIN/PYRIDOXINE HCL (B6) 10 MG-10MG
TABLET,IMMED, EXTENDED RELEASE, BIPHASIC ORAL
COMMUNITY

## 2021-05-27 RX ORDER — HYDROCODONE BITARTRATE AND ACETAMINOPHEN 5; 325 MG/1; MG/1
1 TABLET ORAL EVERY 6 HOURS PRN
COMMUNITY

## 2021-05-27 RX ORDER — AMIODARONE HYDROCHLORIDE 200 MG/1
200 TABLET ORAL DAILY
COMMUNITY
End: 2021-07-14

## 2021-05-27 RX ORDER — MIRTAZAPINE 15 MG/1
15 TABLET, FILM COATED ORAL NIGHTLY
COMMUNITY

## 2021-06-03 ENCOUNTER — TELEPHONE (OUTPATIENT)
Dept: CARDIOLOGY CLINIC | Age: 86
End: 2021-06-03

## 2021-06-03 NOTE — TELEPHONE ENCOUNTER
According to home health nurse patient has been having low bp, 95/60 today when she saw him.    I spoke with patient he says his bp is around 83/42 and he does feel lightheaded when he stands up, please advise

## 2021-06-08 ENCOUNTER — TELEPHONE (OUTPATIENT)
Dept: CARDIOLOGY CLINIC | Age: 86
End: 2021-06-08

## 2021-06-08 NOTE — TELEPHONE ENCOUNTER
Fawad Holm from [facility], she is with the patient and stating his B/P is currently 88/40,    Pt stated that he is dizzy at times, and Per Dr. Qureshi Anger he decreased Losartan to 12.5 mg & Aldactone to 12.5 mg.      He would like to know what to do, he does not want to go to ER

## 2021-06-15 ENCOUNTER — TELEPHONE (OUTPATIENT)
Dept: CARDIAC CATH/INVASIVE PROCEDURES | Age: 86
End: 2021-06-15

## 2021-06-15 ENCOUNTER — TELEPHONE (OUTPATIENT)
Dept: CARDIOLOGY CLINIC | Age: 86
End: 2021-06-15

## 2021-06-15 NOTE — TELEPHONE ENCOUNTER
Hold metformin, losartan and aldactone for now. Dixie Ortiz D.O.   Cardiologist  Cardiology, [address] [facility]

## 2021-06-15 NOTE — TELEPHONE ENCOUNTER
Reminded daughter of patient's scheduled procedure on 6/16/21. Instructions given and COVID questionnaire completed.  P

## 2021-06-16 ENCOUNTER — HOSPITAL ENCOUNTER (OUTPATIENT)
Dept: CARDIAC CATH/INVASIVE PROCEDURES | Age: 86
Discharge: HOME | End: 2021-06-16
Payer: MEDICARE

## 2021-06-16 VITALS
RESPIRATION RATE: 18 BRPM | TEMPERATURE: 97.1 F | SYSTOLIC BLOOD PRESSURE: 109 MMHG | HEIGHT: 67 IN | WEIGHT: 140 LBS | HEART RATE: 55 BPM | DIASTOLIC BLOOD PRESSURE: 52 MMHG | BODY MASS INDEX: 21.97 KG/M2

## 2021-06-16 LAB
ABO/RH: NORMAL
ANION GAP SERPL CALCULATED.3IONS-SCNC: 8 MMOL/L (ref 7–16)
ANTIBODY SCREEN: NORMAL
BASOPHILS ABSOLUTE: 0.06 E9/L (ref 0–0.2)
BASOPHILS RELATIVE PERCENT: 0.9 % (ref 0–2)
BUN BLDV-MCNC: 22 MG/DL (ref 6–23)
CALCIUM SERPL-MCNC: 7.9 MG/DL (ref 8.6–10.2)
CHLORIDE BLD-SCNC: 96 MMOL/L (ref 98–107)
CO2: 36 MMOL/L (ref 22–29)
CREAT SERPL-MCNC: 1.3 MG/DL (ref 0.7–1.2)
EOSINOPHILS ABSOLUTE: 0.27 E9/L (ref 0.05–0.5)
EOSINOPHILS RELATIVE PERCENT: 3.9 % (ref 0–6)
GFR AFRICAN AMERICAN: >60
GFR NON-AFRICAN AMERICAN: 52 ML/MIN/1.73
GLUCOSE BLD-MCNC: 111 MG/DL (ref 74–99)
HCT VFR BLD CALC: 34.4 % (ref 37–54)
HEMOGLOBIN: 10.6 G/DL (ref 12.5–16.5)
IMMATURE GRANULOCYTES #: 0.04 E9/L
IMMATURE GRANULOCYTES %: 0.6 % (ref 0–5)
LYMPHOCYTES ABSOLUTE: 1.27 E9/L (ref 1.5–4)
LYMPHOCYTES RELATIVE PERCENT: 18.1 % (ref 20–42)
MCH RBC QN AUTO: 27.4 PG (ref 26–35)
MCHC RBC AUTO-ENTMCNC: 30.8 % (ref 32–34.5)
MCV RBC AUTO: 88.9 FL (ref 80–99.9)
MONOCYTES ABSOLUTE: 0.89 E9/L (ref 0.1–0.95)
MONOCYTES RELATIVE PERCENT: 12.7 % (ref 2–12)
NEUTROPHILS ABSOLUTE: 4.47 E9/L (ref 1.8–7.3)
NEUTROPHILS RELATIVE PERCENT: 63.8 % (ref 43–80)
PDW BLD-RTO: 17.1 FL (ref 11.5–15)
PLATELET # BLD: 161 E9/L (ref 130–450)
PMV BLD AUTO: 11.2 FL (ref 7–12)
POTASSIUM SERPL-SCNC: 4.7 MMOL/L (ref 3.5–5)
RBC # BLD: 3.87 E12/L (ref 3.8–5.8)
SODIUM BLD-SCNC: 140 MMOL/L (ref 132–146)
WBC # BLD: 7 E9/L (ref 4.5–11.5)

## 2021-06-16 PROCEDURE — 86901 BLOOD TYPING SEROLOGIC RH(D): CPT

## 2021-06-16 PROCEDURE — 86850 RBC ANTIBODY SCREEN: CPT

## 2021-06-16 PROCEDURE — 86900 BLOOD TYPING SEROLOGIC ABO: CPT

## 2021-06-16 PROCEDURE — 36415 COLL VENOUS BLD VENIPUNCTURE: CPT

## 2021-06-16 PROCEDURE — 93454 CORONARY ARTERY ANGIO S&I: CPT

## 2021-06-16 PROCEDURE — 6360000002 HC RX W HCPCS

## 2021-06-16 PROCEDURE — 93454 CORONARY ARTERY ANGIO S&I: CPT | Performed by: INTERNAL MEDICINE

## 2021-06-16 PROCEDURE — 2709999900 HC NON-CHARGEABLE SUPPLY

## 2021-06-16 PROCEDURE — 2500000003 HC RX 250 WO HCPCS

## 2021-06-16 PROCEDURE — C1887 CATHETER, GUIDING: HCPCS

## 2021-06-16 PROCEDURE — 80048 BASIC METABOLIC PNL TOTAL CA: CPT

## 2021-06-16 PROCEDURE — C1769 GUIDE WIRE: HCPCS

## 2021-06-16 PROCEDURE — 85025 COMPLETE CBC W/AUTO DIFF WBC: CPT

## 2021-06-16 PROCEDURE — C1894 INTRO/SHEATH, NON-LASER: HCPCS

## 2021-06-16 RX ORDER — SODIUM CHLORIDE 0.9 % (FLUSH) 0.9 %
5-40 SYRINGE (ML) INJECTION PRN
Status: DISCONTINUED | OUTPATIENT
Start: 2021-06-16 | End: 2021-06-17 | Stop reason: HOSPADM

## 2021-06-16 RX ORDER — SODIUM CHLORIDE 9 MG/ML
25 INJECTION, SOLUTION INTRAVENOUS PRN
Status: DISCONTINUED | OUTPATIENT
Start: 2021-06-16 | End: 2021-06-17 | Stop reason: HOSPADM

## 2021-06-16 RX ORDER — SODIUM CHLORIDE 0.9 % (FLUSH) 0.9 %
5-40 SYRINGE (ML) INJECTION EVERY 12 HOURS SCHEDULED
Status: DISCONTINUED | OUTPATIENT
Start: 2021-06-16 | End: 2021-06-17 | Stop reason: HOSPADM

## 2021-06-16 RX ORDER — ACETAMINOPHEN 325 MG/1
650 TABLET ORAL EVERY 4 HOURS PRN
Status: DISCONTINUED | OUTPATIENT
Start: 2021-06-16 | End: 2021-06-17 | Stop reason: HOSPADM

## 2021-06-16 NOTE — PROCEDURES
[address]                  [address] [id number],  [facility]                            CARDIAC CATHETERIZATION    PATIENT NAME: John Aguilar                    :        1935  MED REC NO:   [id number]                            ROOM:  ACCOUNT NO:   [de-identified]                           ADMIT DATE: 2021  PROVIDER:     Kym Palacios MD    DATE OF PROCEDURE:  2021    LEFT HEART CATHETERIZATION    INDICATION:  Severe LV dysfunction with known CAD and prior stenting to  LAD and circumflex artery. REFERRING PROVIDER:  Dr. Lavon Gregory. AUC indication CHF and score 7. PROCEDURE NOTE:  After obtaining a signed consent from the patient, he  was brought to the cardiac cath lab. Under sterile condition and under  local anesthetic and using conscious sedation, a 6-Slovenian Terumo slender  sheath was inserted into the right radial artery. The patient received  5000 of intravenous heparin. He also received 200 mcg of intraarterial  nitroglycerin via the right radial sheath. Then, a 5-Slovenian TIG  diagnostic catheter was advanced to the ascending aorta without  difficulty. The left main coronary artery was engaged and a coronary  angiogram was done. This catheter failed to engage the right coronary  artery. It was exchanged over a guidewire to a 5-Slovenian JR4 diagnostic  catheter, which was able to engage the right coronary artery and a  coronary angiogram was done. This catheter was exchanged over a  guidewire to a 5-Slovenian pigtail, which failed to cross the aortic valve  probably to some degree of aortic stenosis. At the end of the  procedure, pigtail was pulled out. The Terumo slender sheath was pulled  out and a Vasc Band was applied over the right radial artery with good  hemostasis. The patient tolerated the procedure very well and no  complications were noted. No significant blood loss occurred during the  procedure.     FINDINGS HEMODYNAMICS:  Aortic pressure 115/62 mmHg. CORONARY ARTERY:  Left main:  It is a large artery with no significant angiographic  stenosis noted. LAD:  It is a large artery wrapping around the apex and giving rise to  three diagonal branches and several septal perforators. The LAD was  heavily calcified in its proximal to mid segment. There was 80%  discrete proximal LAD stenosis. There was a patent stent in the mid LAD  with 80-90% discrete mid stenosis at the distal edge of the stent. The  LAD was very angulated at the mid segment and calcified with 60-70%  discrete stenosis. The first diagonal was small. The second diagonal  was fair in size with 30-40% proximal stenosis. The third diagonal was  small. Left circumflex: It is a large artery giving rise to a large  bifurcating obtuse marginal branch, then it is 100% occluded at the mid  segment. There was a faint flow to the second obtuse marginal branch  from collateral circulation, probably from the first obtuse marginal  branch. There was a patent stent noted in the proximal OM1 branch with  70% discrete ostial stenosis of the proximal segment of the upper branch  of the OM. Right coronary artery was large and ectatic in its proximal segment and  100% occluded. The RCA was heavily calcified. The PDA and PLV branch  were collateralized from the LAD. IMPRESSION  1. Severe triple-vessel CAD. 2.  Chronic total occlusion of the mid circumflex artery with faint  collaterals from the OM1 to OM2, chronic total occlusion of the RCA with  collaterals to the PDA and PLV branch from the LAD. 3.  Patent mid LAD stent with 80-90% discrete stenosis at the distal  edge of the stent and 80% discrete proximal stenosis. 4.  Patent OM1 stent. 5.  Probable aortic stenosis. RECOMMENDATIONS  1. Consult CT Surgery for possible CABG. 2.  If the patient is not candidate for CABG, consider spot stenting of  the proximal and mid LAD.     PROCEDURE START TIME:  10:33 a.m. PROCEDURE END TIME:  10:56 a.m. FLUOROSCOPY TIME:  5.7 minutes. CONTRAST VOLUME:  35 mL of Isovue. CONSCIOUS SEDATION:  1 mg of intravenous Versed and 25 mcg of  intravenous fentanyl.         Ascencion Huynh MD    D: 06/16/2021 11:20:11       T: 06/16/2021 11:26:54     [id number]  Job#: [id number]     Doc#: [id number]    CC:

## 2021-06-17 ENCOUNTER — TELEPHONE (OUTPATIENT)
Dept: ADMINISTRATIVE | Age: 86
End: 2021-06-17

## 2021-06-17 NOTE — TELEPHONE ENCOUNTER
Pt underwent heart cath 6/16/21 at [facility]. Please call Deidra with follow up appt with Dr. Juancarlos Bustillos.

## 2021-06-22 ENCOUNTER — TELEPHONE (OUTPATIENT)
Dept: CARDIOLOGY CLINIC | Age: 86
End: 2021-06-22

## 2021-06-22 NOTE — TELEPHONE ENCOUNTER
Visiting nurse called to report that this morning she was with Jose Natarajan and his BP was 80/44 and HR 57, she said he was not feeling dizzy or SOB. He has appointment scheduled for 07/14/21 to see you here.

## 2021-06-23 ENCOUNTER — HOSPITAL ENCOUNTER (EMERGENCY)
Dept: HOSPITAL 83 - ED | Age: 86
Discharge: ANOTHER HOSPITAL | End: 2021-06-23
Payer: MEDICARE

## 2021-06-23 VITALS — WEIGHT: 144 LBS | HEIGHT: 60 IN

## 2021-06-23 DIAGNOSIS — F41.9: ICD-10-CM

## 2021-06-23 DIAGNOSIS — N13.2: Primary | ICD-10-CM

## 2021-06-23 DIAGNOSIS — Z95.818: ICD-10-CM

## 2021-06-23 DIAGNOSIS — Z79.899: ICD-10-CM

## 2021-06-23 DIAGNOSIS — I10: ICD-10-CM

## 2021-06-23 DIAGNOSIS — E78.5: ICD-10-CM

## 2021-06-23 DIAGNOSIS — Z98.890: ICD-10-CM

## 2021-06-23 DIAGNOSIS — K21.9: ICD-10-CM

## 2021-06-23 DIAGNOSIS — Z79.84: ICD-10-CM

## 2021-06-23 DIAGNOSIS — J90: ICD-10-CM

## 2021-06-23 LAB
ALBUMIN SERPL-MCNC: 3.3 GM/DL (ref 3.1–4.5)
ALP SERPL-CCNC: 58 U/L (ref 45–117)
ALT SERPL W P-5'-P-CCNC: 17 U/L (ref 12–78)
AST SERPL-CCNC: 13 IU/L (ref 3–35)
BASOPHILS # BLD AUTO: 0 10*3/UL (ref 0–0.1)
BASOPHILS NFR BLD AUTO: 0.6 % (ref 0–1)
BUN SERPL-MCNC: 32 MG/DL (ref 7–24)
CASTS URNS QL MICRO: (no result)
CHLORIDE SERPL-SCNC: 101 MMOL/L (ref 98–107)
CREAT SERPL-MCNC: 1.4 MG/DL (ref 0.7–1.3)
EOSINOPHIL # BLD AUTO: 0.3 10*3/UL (ref 0–0.4)
EOSINOPHIL # BLD AUTO: 3.8 % (ref 1–4)
EPI CELLS #/AREA URNS HPF: (no result) /[HPF]
ERYTHROCYTE [DISTWIDTH] IN BLOOD BY AUTOMATED COUNT: 17.7 % (ref 0–14.5)
HCT VFR BLD AUTO: 33.8 % (ref 42–52)
LYMPHOCYTES # BLD AUTO: 0.7 10*3/UL (ref 1.3–4.4)
LYMPHOCYTES NFR BLD AUTO: 9.9 % (ref 27–41)
MCH RBC QN AUTO: 27.6 PG (ref 27–31)
MCHC RBC AUTO-ENTMCNC: 31.1 G/DL (ref 33–37)
MCV RBC AUTO: 88.9 FL (ref 80–94)
MONOCYTES # BLD AUTO: 0.8 10*3/UL (ref 0.1–1)
MONOCYTES NFR BLD MANUAL: 11.3 % (ref 3–9)
NEUT #: 5.1 10*3/UL (ref 2.3–7.9)
NEUT %: 74 % (ref 47–73)
NRBC BLD QL AUTO: 0 10*3/UL (ref 0–0)
PH UR STRIP: 5 [PH] (ref 4.5–8)
PLATELET # BLD AUTO: 157 10*3/UL (ref 130–400)
PMV BLD AUTO: 10.3 FL (ref 9.6–12.3)
POTASSIUM SERPL-SCNC: 4.4 MMOL/L (ref 3.5–5.1)
PROT SERPL-MCNC: 6.5 GM/DL (ref 6.4–8.2)
RBC # BLD AUTO: 3.8 10*6/UL (ref 4.5–5.9)
SODIUM SERPL-SCNC: 142 MMOL/L (ref 136–145)
SP GR UR: 1.01 (ref 1–1.03)
UROBILINOGEN UR STRIP-MCNC: 1 E.U./DL (ref 0–1)
WBC #/AREA URNS HPF: (no result) WBC/HPF (ref 0–5)
WBC NRBC COR # BLD AUTO: 6.9 10*3/UL (ref 4.8–10.8)

## 2021-06-27 ENCOUNTER — HOSPITAL ENCOUNTER (OUTPATIENT)
Dept: HOSPITAL 83 - LAB | Age: 86
Discharge: HOME | End: 2021-06-27
Attending: FAMILY MEDICINE
Payer: MEDICARE

## 2021-06-27 DIAGNOSIS — R19.7: Primary | ICD-10-CM

## 2021-07-07 ENCOUNTER — TELEPHONE (OUTPATIENT)
Dept: CARDIOLOGY CLINIC | Age: 86
End: 2021-07-07

## 2021-07-07 NOTE — TELEPHONE ENCOUNTER
Called pt, informed him to reduce Toprol to 25 mg and Amiodaraone to 100mg daily. The pt. Was unsure which medication, he receives meds in a blister pack. He requested that medication be sent in labeled bottles.   [address], [phone] spoke with Dayron Marroquin Pharmacist, informed her, she stated she will call pt and review which medications they are and change his medications to labeled bottles

## 2021-07-07 NOTE — TELEPHONE ENCOUNTER
Have him reduce the toprol to 25 mg daily and the amiodarone to 100 mg daily. Radha Castillo D.O.   Cardiologist  Cardiology, [address] [facility]

## 2021-07-07 NOTE — TELEPHONE ENCOUNTER
Shruti Hubbard from pts. Home health calls, she stated pt. B/P is 72/40 and HR is 51, he is feeling a little fatigued. She stated no chest pains or SOB. Informed her on 6-22-21 pt was instructed to hold Losartan and Aldactone. She stated the pt. Gets medication in a blister pack and is unsure of medication.   She stated she will review and continue to monitor and report B/P  Appt is schedule for 07/14/2021

## 2021-07-14 ENCOUNTER — OFFICE VISIT (OUTPATIENT)
Dept: CARDIOLOGY CLINIC | Age: 86
End: 2021-07-14
Payer: MEDICARE

## 2021-07-14 VITALS
SYSTOLIC BLOOD PRESSURE: 102 MMHG | DIASTOLIC BLOOD PRESSURE: 52 MMHG | BODY MASS INDEX: 24.92 KG/M2 | HEART RATE: 62 BPM | WEIGHT: 146 LBS | HEIGHT: 64 IN | RESPIRATION RATE: 20 BRPM

## 2021-07-14 DIAGNOSIS — I25.10 CORONARY ARTERY DISEASE INVOLVING NATIVE CORONARY ARTERY OF NATIVE HEART WITHOUT ANGINA PECTORIS: Primary | ICD-10-CM

## 2021-07-14 PROCEDURE — 93000 ELECTROCARDIOGRAM COMPLETE: CPT | Performed by: INTERNAL MEDICINE

## 2021-07-14 PROCEDURE — G8417 CALC BMI ABV UP PARAM F/U: HCPCS | Performed by: INTERNAL MEDICINE

## 2021-07-14 PROCEDURE — 99214 OFFICE O/P EST MOD 30 MIN: CPT | Performed by: INTERNAL MEDICINE

## 2021-07-14 PROCEDURE — 4040F PNEUMOC VAC/ADMIN/RCVD: CPT | Performed by: INTERNAL MEDICINE

## 2021-07-14 PROCEDURE — 1036F TOBACCO NON-USER: CPT | Performed by: INTERNAL MEDICINE

## 2021-07-14 PROCEDURE — 1123F ACP DISCUSS/DSCN MKR DOCD: CPT | Performed by: INTERNAL MEDICINE

## 2021-07-14 PROCEDURE — G8427 DOCREV CUR MEDS BY ELIG CLIN: HCPCS | Performed by: INTERNAL MEDICINE

## 2021-07-14 RX ORDER — METOPROLOL TARTRATE 50 MG/1
1 TABLET, FILM COATED ORAL 2 TIMES DAILY
COMMUNITY
Start: 2021-06-24 | End: 2021-07-14

## 2021-07-14 RX ORDER — AMIODARONE HYDROCHLORIDE 100 MG/1
100 TABLET ORAL DAILY
Qty: 90 TABLET | Refills: 3
Start: 2021-07-14 | End: 2021-07-20 | Stop reason: SDUPTHER

## 2021-07-14 NOTE — PROGRESS NOTES
CHIEF COMPLAINT: CAD/CMP    HISTORY OF PRESENT ILLNESS: Patient is a 80 y.o. male seen at the request of Adis Squires DO. Some CP/angina. Some SOB. Volume markedly improved. Medical history includes:   1. Essential hypertension. 2. Hyperlipidemia. 3. Type II diabetes mellitus. 4. Hiatal hernia. 5. Coronary artery disease, status post KARIME to the LAD and OM - 01/23/2006. 6. Cardiac cath, 2009. Mild-moderate CAD, but no high grade stenoses. 7. Stress MPS, 08/2012. Normal perfusion. EF 62%. 8. Echo, 03/25/2015. Normal LV size with LV systolic function at the lower limits of normal.  RV size and function normal.  Trace mitral insufficiency. Aortic sclerosis with mild aortic stenosis. Mean gradient 7 mmHg. 9. Lexiscan MPS 12/02/2015. No evidence for ischemia. EF 78%. No transient cavity dilatation. Low-risk study.     Past Medical History:   Diagnosis Date    Arthritic-like pain     Arthritis     CAD (coronary artery disease)     Chest discomfort     Diabetes mellitus ([address]     Enlarged prostate     Exposure to chemical compounds     History of blood transfusion     Hyperlipidemia     Hypertension     Kidney stones     Wears glasses        Patient Active Problem List   Diagnosis    Coronary artery disease involving native heart    Essential hypertension    Mixed hyperlipidemia    Aortic valve sclerosis       No Known Allergies    Current Outpatient Medications   Medication Sig Dispense Refill    amiodarone (PACERONE) 100 MG tablet Take 1 tablet by mouth daily 90 tablet 3    polyethylene glycol (GLYCOLAX) 17 g packet Take 17 g by mouth daily as needed for Constipation      diphenhydrAMINE HCl (BENADRYL ALLERGY PO) Take 20 mg by mouth daily      Melatonin 10-10 MG TBCR Take by mouth      mirtazapine (REMERON) 15 MG tablet Take 15 mg by mouth nightly      potassium chloride (KLOR-CON M) 20 MEQ TBCR extended release tablet Take 20 mEq by mouth daily (with breakfast)  Pantoprazole Sodium (PROTONIX PO) Take 40 mg by mouth      furosemide (LASIX) 40 MG tablet Take 1 tablet by mouth daily      losartan (COZAAR) 25 MG tablet Take 12.5 tablets by mouth daily       metoprolol succinate (TOPROL XL) 50 MG extended release tablet Take 1 tablet by mouth daily      spironolactone (ALDACTONE) 25 MG tablet Take 12.5 tablets by mouth daily       aspirin 81 MG tablet Take 81 mg by mouth daily      clopidogrel (PLAVIX) 75 MG tablet Take 75 mg by mouth daily      metFORMIN (GLUCOPHAGE) 500 MG tablet Take 500 mg by mouth 3 times daily       doxazosin (CARDURA) 4 MG tablet Take 4 mg by mouth nightly      isosorbide mononitrate (IMDUR) 30 MG CR tablet Take 30 mg by mouth daily      HYDROcodone-acetaminophen (NORCO) 5-325 MG per tablet Take 1 tablet by mouth every 6 hours as needed for Pain. (Patient not taking: Reported on 7/14/2021)       No current facility-administered medications for this visit. Social History     Socioeconomic History    Marital status:      Spouse name: Not on file    Number of children: Not on file    Years of education: Not on file    Highest education level: Not on file   Occupational History    Not on file   Tobacco Use    Smoking status: Never Smoker    Smokeless tobacco: Never Used   Vaping Use    Vaping Use: Never used   Substance and Sexual Activity    Alcohol use: No    Drug use: No    Sexual activity: Not on file   Other Topics Concern    Not on file   Social History Narrative    Not on file     Social Determinants of Health     Financial Resource Strain:     Difficulty of Paying Living Expenses:    Food Insecurity:     Worried About Running Out of Food in the Last Year:     [address] in the Last Year:    Transportation Needs:     Lack of Transportation (Medical):      Lack of Transportation (Non-Medical):    Physical Activity:     Days of Exercise per Week:     Minutes of Exercise per Session:    Stress:     Feeling of Stress :    Social Connections:     Frequency of Communication with Friends and Family:     Frequency of Social Gatherings with Friends and Family:     Attends Yazidi Services:     Active Member of Clubs or Organizations:     Attends Club or Organization Meetings:     Marital Status:    Intimate Partner Violence:     Fear of Current or Ex-Partner:     Emotionally Abused:     Physically Abused:     Sexually Abused:        History reviewed. No pertinent family history. Review of Systems:  Heart: as above   Lungs: as above   Eyes: denies changes in vision or discharge. Ears: denies changes in hearing or pain. Nose: denies epistaxis or masses   Throat: denies sore throat or trouble swallowing. Neuro: denies numbness, tingling, tremors. Skin: denies rashes or itching. : denies hematuria, dysuria   GI: denies vomiting, diarrhea   Psych: denies mood changed, anxiety, depression. All other systems negative. Physical Exam   BP (!) 102/52   Resp 20   Ht 5' 4\" (1.626 m)   Wt 146 lb (66.2 kg)   BMI 25.06 kg/m²   Constitutional: Oriented to person, place, and time. Well-developed and well-nourished. No distress. Head: Normocephalic and atraumatic. Eyes: EOM are normal. Pupils are equal, round, and reactive to light. Neck: Normal range of motion. Neck supple. No hepatojugular reflux and no JVD present. Carotid bruit is not present. No tracheal deviation present. No thyromegaly present. Cardiovascular: Normal rate, regular rhythm, normal heart sounds and intact distal pulses. Exam reveals no gallop and no friction rub. No murmur heard. Pulmonary/Chest: Effort normal and breath sounds normal. No respiratory distress. No wheezes. No rales. No tenderness. Abdominal: Soft. Bowel sounds are normal. No distension and no mass. No tenderness. No rebound and no guarding. Musculoskeletal: Normal range of motion. No edema and no tenderness.    Lymphadenopathy:   No cervical adenopathy. No groin adenopathy. Neurological: Alert and oriented to person, place, and time. Skin: Skin is warm and dry. No rash noted. Not diaphoretic. No erythema. Psychiatric: Normal mood and affect. Behavior is normal.     EKG:  normal sinus rhythm, nonspecific ST and T waves changes, RBBB. Echo Conclusion 4/27/2021:        LVEF is 15-20%.      There is global hypokinesis of the left ventricle.        Left ventricle is mildly dilated.        Grade II - pseudonormal filling dynamics.      The right ventricular systolic function is normal.        Left atrium is mildly dilated.        Moderate mitral regurgitation.           Mild to moderate tricuspid regurgitation.        Mild pulmonary hypertension. The RVSP is 47 mmHg. Stress Conclusion 4/30/2021:        Abnormal study.  Scintigraphic evidence for moderate-sized, medium         intensity fixed defect of the inferior wall consistent with scar.           Abnormal study.  Scintigraphic evidence for moderate-sized, medium         intensity fixed defect of the inferior wall consistent with scar. CATH IMPRESSION 6/16/2021:  1. Severe triple-vessel CAD. 2.  Chronic total occlusion of the mid circumflex artery with faint collaterals from the OM1 to OM2, chronic total occlusion of the RCA with collaterals to the PDA and PLV branch from the LAD. 3.  Patent mid LAD stent with 80-90% discrete stenosis at the distal edge of the stent and 80% discrete proximal stenosis. 4.  Patent OM1 stent. 5.  Probable aortic stenosis. RECOMMENDATIONS  1. Consult CT Surgery for possible CABG. 2.  If the patient is not candidate for CABG, consider spot stenting of the proximal and mid LAD. ASSESSMENT AND PLAN:  Patient Active Problem List   Diagnosis    Coronary artery disease involving native heart    Essential hypertension    Mixed hyperlipidemia    Aortic valve sclerosis     1. CAD:     Cath with multivessel CAD. Poor CABG candidate.     Arrange PCI to LAD. ASA/BB/plavix/statin/nitrate. 2. Acute systolic CHF:    BB/ARB/nitrate/aldactone/lasix. 3. NSVT: Amio/BB. 4. HTN: Observe. 5. Lipids: Statin. 6. DM    Liza Cole D.O.   Cardiologist  Cardiology, [address]

## 2021-07-20 RX ORDER — AMIODARONE HYDROCHLORIDE 100 MG/1
100 TABLET ORAL DAILY
Qty: 90 TABLET | Refills: 3 | Status: SHIPPED | OUTPATIENT
Start: 2021-07-20

## 2021-07-20 RX ORDER — METOPROLOL SUCCINATE 25 MG/1
25 TABLET, EXTENDED RELEASE ORAL DAILY
Qty: 90 TABLET | Refills: 3 | Status: SHIPPED | OUTPATIENT
Start: 2021-07-20

## 2021-07-20 RX ORDER — METOPROLOL SUCCINATE 25 MG/1
25 TABLET, EXTENDED RELEASE ORAL DAILY
COMMUNITY
End: 2021-07-20 | Stop reason: SDUPTHER

## 2021-07-20 RX ORDER — SPIRONOLACTONE 25 MG/1
12.5 TABLET ORAL DAILY
Qty: 45 TABLET | Refills: 3 | Status: SHIPPED | OUTPATIENT
Start: 2021-07-20

## 2021-07-29 ENCOUNTER — TELEPHONE (OUTPATIENT)
Dept: CARDIOLOGY CLINIC | Age: 86
End: 2021-07-29

## 2021-07-29 NOTE — TELEPHONE ENCOUNTER
Please arrange cath and PCI for this patient. Use AUC 7/56. Carl Fenton D.O.   Cardiologist  Cardiology, [facility]

## 2021-07-29 NOTE — TELEPHONE ENCOUNTER
Ebony Gan was seen by you on 07/14/21 f/up from Cath 06/16/21,  The daughter said she thought he was going to be scheduled for another Cath? I do not see another cath ordered but in your visit you do say consult CT Surgery for possible CABG? I called that office and left a message on Patricia's vm to schedule this patient, but daughter says she has not heard anything. Please clarify what Radha Graham next steps are please.  Thank you

## 2021-07-30 ENCOUNTER — TELEPHONE (OUTPATIENT)
Dept: CARDIOLOGY CLINIC | Age: 86
End: 2021-07-30

## 2021-07-30 DIAGNOSIS — I25.119 CORONARY ARTERY DISEASE INVOLVING NATIVE CORONARY ARTERY OF NATIVE HEART WITH ANGINA PECTORIS (HCC): ICD-10-CM

## 2021-07-30 DIAGNOSIS — I50.9 CHRONIC CONGESTIVE HEART FAILURE, UNSPECIFIED HEART FAILURE TYPE (HCC): ICD-10-CM

## 2021-08-04 ENCOUNTER — TELEPHONE (OUTPATIENT)
Dept: CARDIAC CATH/INVASIVE PROCEDURES | Age: 86
End: 2021-08-04

## 2021-08-05 ENCOUNTER — HOSPITAL ENCOUNTER (OUTPATIENT)
Dept: CARDIAC CATH/INVASIVE PROCEDURES | Age: 86
Discharge: HOME | End: 2021-08-05
Payer: MEDICARE

## 2021-08-05 VITALS
DIASTOLIC BLOOD PRESSURE: 73 MMHG | RESPIRATION RATE: 20 BRPM | BODY MASS INDEX: 22.6 KG/M2 | TEMPERATURE: 97.6 F | HEART RATE: 140 BPM | OXYGEN SATURATION: 95 % | WEIGHT: 144 LBS | SYSTOLIC BLOOD PRESSURE: 100 MMHG | HEIGHT: 67 IN

## 2021-08-05 LAB
ABO/RH: NORMAL
ANION GAP SERPL CALCULATED.3IONS-SCNC: 11 MMOL/L (ref 7–16)
ANTIBODY SCREEN: NORMAL
BASOPHILS ABSOLUTE: 0.06 E9/L (ref 0–0.2)
BASOPHILS RELATIVE PERCENT: 0.8 % (ref 0–2)
BUN BLDV-MCNC: 18 MG/DL (ref 6–23)
CALCIUM SERPL-MCNC: 9.4 MG/DL (ref 8.6–10.2)
CHLORIDE BLD-SCNC: 100 MMOL/L (ref 98–107)
CO2: 29 MMOL/L (ref 22–29)
CREAT SERPL-MCNC: 1.5 MG/DL (ref 0.7–1.2)
EOSINOPHILS ABSOLUTE: 0.35 E9/L (ref 0.05–0.5)
EOSINOPHILS RELATIVE PERCENT: 4.6 % (ref 0–6)
GFR AFRICAN AMERICAN: 54
GFR NON-AFRICAN AMERICAN: 44 ML/MIN/1.73
GLUCOSE BLD-MCNC: 124 MG/DL (ref 74–99)
HCT VFR BLD CALC: 37 % (ref 37–54)
HEMOGLOBIN: 11.3 G/DL (ref 12.5–16.5)
IMMATURE GRANULOCYTES #: 0.03 E9/L
IMMATURE GRANULOCYTES %: 0.4 % (ref 0–5)
LYMPHOCYTES ABSOLUTE: 1.34 E9/L (ref 1.5–4)
LYMPHOCYTES RELATIVE PERCENT: 17.7 % (ref 20–42)
MAGNESIUM: 1.4 MG/DL (ref 1.6–2.6)
MCH RBC QN AUTO: 29 PG (ref 26–35)
MCHC RBC AUTO-ENTMCNC: 30.5 % (ref 32–34.5)
MCV RBC AUTO: 94.9 FL (ref 80–99.9)
MONOCYTES ABSOLUTE: 0.79 E9/L (ref 0.1–0.95)
MONOCYTES RELATIVE PERCENT: 10.4 % (ref 2–12)
NEUTROPHILS ABSOLUTE: 5.01 E9/L (ref 1.8–7.3)
NEUTROPHILS RELATIVE PERCENT: 66.1 % (ref 43–80)
PDW BLD-RTO: 17 FL (ref 11.5–15)
PLATELET # BLD: 156 E9/L (ref 130–450)
PMV BLD AUTO: 10.3 FL (ref 7–12)
POTASSIUM SERPL-SCNC: 4.1 MMOL/L (ref 3.5–5)
RBC # BLD: 3.9 E12/L (ref 3.8–5.8)
SODIUM BLD-SCNC: 140 MMOL/L (ref 132–146)
WBC # BLD: 7.6 E9/L (ref 4.5–11.5)

## 2021-08-05 PROCEDURE — 2580000003 HC RX 258: Performed by: INTERNAL MEDICINE

## 2021-08-05 PROCEDURE — 36415 COLL VENOUS BLD VENIPUNCTURE: CPT

## 2021-08-05 PROCEDURE — 86901 BLOOD TYPING SEROLOGIC RH(D): CPT

## 2021-08-05 PROCEDURE — 86900 BLOOD TYPING SEROLOGIC ABO: CPT

## 2021-08-05 PROCEDURE — 80048 BASIC METABOLIC PNL TOTAL CA: CPT

## 2021-08-05 PROCEDURE — 83735 ASSAY OF MAGNESIUM: CPT

## 2021-08-05 PROCEDURE — 86850 RBC ANTIBODY SCREEN: CPT

## 2021-08-05 PROCEDURE — 85025 COMPLETE CBC W/AUTO DIFF WBC: CPT

## 2021-08-05 PROCEDURE — 99218 PR INITIAL OBSERVATION CARE/DAY 30 MINUTES: CPT | Performed by: INTERNAL MEDICINE

## 2021-08-05 RX ORDER — SODIUM CHLORIDE 9 MG/ML
INJECTION, SOLUTION INTRAVENOUS ONCE
Status: COMPLETED | OUTPATIENT
Start: 2021-08-05 | End: 2021-08-05

## 2021-08-05 RX ADMIN — SODIUM CHLORIDE: 9 INJECTION, SOLUTION INTRAVENOUS at 10:40

## 2021-08-05 NOTE — PROGRESS NOTES
Patient seen and examined. Medical records and Cath films reviewed. Patient underwent coronary angiography inn 6/2021 by Dr. Ki Soto. Per his note CT surgery evaluation was recommended and if declined for CABG then \" spot stenting \" to mid and proximal LAD is to be considered ( the LAD is providing collaterals to the RCA ). Dr. Obed Hammer did not think that the patient is a candidate for CABG ( patient did not see CT surgery ) and refereed patient today as outpatient for PCI. I spent 15 minutes talking to the patient and his daughter. His daughter was upset that her Dad has been waiting for the procedure since 8:00 am. Hi risk LAD PCI discussed with patient. Risk including but not limited to death discussed. He stated he is not ready to die and wanted to go home to think about wether he wants to have the procedure done or not.   Electronically signed by Marvin Rutherford MD on 8/5/2021 at 3:24 PM

## 2021-08-05 NOTE — PROGRESS NOTES
Pt procedure cancelled. See Dr Panchito Vasquez note. IVs discontinued. No bleeding/hematoma noted Bandaid to sites. Assisted to bathroom, gait steady. Dressed and ready for discharge. Discharge instructions given. Pt verbalized an understanding. No questions at this time.

## 2021-08-10 ENCOUNTER — HOSPITAL ENCOUNTER (INPATIENT)
Dept: HOSPITAL 83 - ED | Age: 86
LOS: 3 days | Discharge: HOME | DRG: 378 | End: 2021-08-13
Attending: INTERNAL MEDICINE | Admitting: INTERNAL MEDICINE
Payer: MEDICARE

## 2021-08-10 VITALS — SYSTOLIC BLOOD PRESSURE: 110 MMHG | DIASTOLIC BLOOD PRESSURE: 49 MMHG

## 2021-08-10 VITALS
HEIGHT: 66.97 IN | DIASTOLIC BLOOD PRESSURE: 51 MMHG | BODY MASS INDEX: 22.13 KG/M2 | WEIGHT: 141 LBS | SYSTOLIC BLOOD PRESSURE: 102 MMHG

## 2021-08-10 VITALS — DIASTOLIC BLOOD PRESSURE: 58 MMHG | SYSTOLIC BLOOD PRESSURE: 100 MMHG

## 2021-08-10 VITALS — DIASTOLIC BLOOD PRESSURE: 44 MMHG

## 2021-08-10 VITALS — SYSTOLIC BLOOD PRESSURE: 146 MMHG | DIASTOLIC BLOOD PRESSURE: 85 MMHG

## 2021-08-10 VITALS — SYSTOLIC BLOOD PRESSURE: 96 MMHG | DIASTOLIC BLOOD PRESSURE: 54 MMHG

## 2021-08-10 VITALS — DIASTOLIC BLOOD PRESSURE: 52 MMHG

## 2021-08-10 DIAGNOSIS — N40.0: ICD-10-CM

## 2021-08-10 DIAGNOSIS — E11.22: ICD-10-CM

## 2021-08-10 DIAGNOSIS — E78.2: ICD-10-CM

## 2021-08-10 DIAGNOSIS — E86.1: ICD-10-CM

## 2021-08-10 DIAGNOSIS — Z82.49: ICD-10-CM

## 2021-08-10 DIAGNOSIS — D64.9: ICD-10-CM

## 2021-08-10 DIAGNOSIS — I95.89: ICD-10-CM

## 2021-08-10 DIAGNOSIS — K29.71: Primary | ICD-10-CM

## 2021-08-10 DIAGNOSIS — K74.60: ICD-10-CM

## 2021-08-10 DIAGNOSIS — Z95.5: ICD-10-CM

## 2021-08-10 DIAGNOSIS — K21.9: ICD-10-CM

## 2021-08-10 DIAGNOSIS — N18.31: ICD-10-CM

## 2021-08-10 DIAGNOSIS — I38: ICD-10-CM

## 2021-08-10 DIAGNOSIS — I50.22: ICD-10-CM

## 2021-08-10 DIAGNOSIS — E11.65: ICD-10-CM

## 2021-08-10 DIAGNOSIS — I13.0: ICD-10-CM

## 2021-08-10 DIAGNOSIS — E44.0: ICD-10-CM

## 2021-08-10 DIAGNOSIS — I48.0: ICD-10-CM

## 2021-08-10 DIAGNOSIS — I25.10: ICD-10-CM

## 2021-08-10 DIAGNOSIS — Z98.41: ICD-10-CM

## 2021-08-10 LAB
ALBUMIN SERPL-MCNC: 3.2 GM/DL (ref 3.1–4.5)
ALP SERPL-CCNC: 61 U/L (ref 45–117)
ALT SERPL W P-5'-P-CCNC: 13 U/L (ref 12–78)
AST SERPL-CCNC: 9 IU/L (ref 3–35)
BASOPHILS # BLD AUTO: 0 10*3/UL (ref 0–0.1)
BASOPHILS # BLD AUTO: 0 10*3/UL (ref 0–0.1)
BASOPHILS NFR BLD AUTO: 0.5 % (ref 0–1)
BASOPHILS NFR BLD AUTO: 0.6 % (ref 0–1)
BUN SERPL-MCNC: 21 MG/DL (ref 7–24)
CHLORIDE SERPL-SCNC: 104 MMOL/L (ref 98–107)
CREAT SERPL-MCNC: 1.35 MG/DL (ref 0.7–1.3)
EOSINOPHIL # BLD AUTO: 0.1 10*3/UL (ref 0–0.4)
EOSINOPHIL # BLD AUTO: 0.1 10*3/UL (ref 0–0.4)
EOSINOPHIL # BLD AUTO: 1.4 % (ref 1–4)
EOSINOPHIL # BLD AUTO: 2.2 % (ref 1–4)
ERYTHROCYTE [DISTWIDTH] IN BLOOD BY AUTOMATED COUNT: 15.9 % (ref 0–14.5)
ERYTHROCYTE [DISTWIDTH] IN BLOOD BY AUTOMATED COUNT: 16.2 % (ref 0–14.5)
HCT VFR BLD AUTO: 26.4 % (ref 42–52)
HCT VFR BLD AUTO: 32.1 % (ref 42–52)
LYMPHOCYTES # BLD AUTO: 0.6 10*3/UL (ref 1.3–4.4)
LYMPHOCYTES # BLD AUTO: 0.8 10*3/UL (ref 1.3–4.4)
LYMPHOCYTES NFR BLD AUTO: 10.9 % (ref 27–41)
LYMPHOCYTES NFR BLD AUTO: 11.9 % (ref 27–41)
MCH RBC QN AUTO: 28.7 PG (ref 27–31)
MCH RBC QN AUTO: 29 PG (ref 27–31)
MCHC RBC AUTO-ENTMCNC: 30.7 G/DL (ref 33–37)
MCHC RBC AUTO-ENTMCNC: 30.8 G/DL (ref 33–37)
MCV RBC AUTO: 93.6 FL (ref 80–94)
MCV RBC AUTO: 94.1 FL (ref 80–94)
MONOCYTES # BLD AUTO: 0.6 10*3/UL (ref 0.1–1)
MONOCYTES # BLD AUTO: 0.7 10*3/UL (ref 0.1–1)
MONOCYTES NFR BLD MANUAL: 11.4 % (ref 3–9)
MONOCYTES NFR BLD MANUAL: 9.8 % (ref 3–9)
NEUT #: 4.4 10*3/UL (ref 2.3–7.9)
NEUT #: 4.8 10*3/UL (ref 2.3–7.9)
NEUT %: 74.7 % (ref 47–73)
NEUT %: 75.8 % (ref 47–73)
NRBC BLD QL AUTO: 0 % (ref 0–0)
NRBC BLD QL AUTO: 0 % (ref 0–0)
PLATELET # BLD AUTO: 156 10*3/UL (ref 130–400)
PLATELET # BLD AUTO: 176 10*3/UL (ref 130–400)
PMV BLD AUTO: 10.1 FL (ref 9.6–12.3)
PMV BLD AUTO: 10.2 FL (ref 9.6–12.3)
POTASSIUM SERPL-SCNC: 4.2 MMOL/L (ref 3.5–5.1)
PROT SERPL-MCNC: 6.1 GM/DL (ref 6.4–8.2)
RBC # BLD AUTO: 2.82 10*6/UL (ref 4.5–5.9)
RBC # BLD AUTO: 3.41 10*6/UL (ref 4.5–5.9)
SODIUM SERPL-SCNC: 141 MMOL/L (ref 136–145)
WBC NRBC COR # BLD AUTO: 5.9 10*3/UL (ref 4.8–10.8)
WBC NRBC COR # BLD AUTO: 6.3 10*3/UL (ref 4.8–10.8)

## 2021-08-11 VITALS — DIASTOLIC BLOOD PRESSURE: 58 MMHG

## 2021-08-11 VITALS — SYSTOLIC BLOOD PRESSURE: 130 MMHG | DIASTOLIC BLOOD PRESSURE: 80 MMHG

## 2021-08-11 VITALS — SYSTOLIC BLOOD PRESSURE: 96 MMHG | DIASTOLIC BLOOD PRESSURE: 54 MMHG

## 2021-08-11 VITALS — DIASTOLIC BLOOD PRESSURE: 69 MMHG

## 2021-08-11 VITALS — SYSTOLIC BLOOD PRESSURE: 120 MMHG | DIASTOLIC BLOOD PRESSURE: 68 MMHG

## 2021-08-11 VITALS — SYSTOLIC BLOOD PRESSURE: 74 MMHG | DIASTOLIC BLOOD PRESSURE: 40 MMHG

## 2021-08-11 VITALS — DIASTOLIC BLOOD PRESSURE: 54 MMHG

## 2021-08-11 VITALS — DIASTOLIC BLOOD PRESSURE: 45 MMHG

## 2021-08-11 VITALS — DIASTOLIC BLOOD PRESSURE: 80 MMHG

## 2021-08-11 LAB
ALBUMIN SERPL-MCNC: 3 GM/DL (ref 3.1–4.5)
ALP SERPL-CCNC: 54 U/L (ref 45–117)
ALT SERPL W P-5'-P-CCNC: 12 U/L (ref 12–78)
AST SERPL-CCNC: 10 IU/L (ref 3–35)
BASOPHILS # BLD AUTO: 0 10*3/UL (ref 0–0.1)
BASOPHILS NFR BLD AUTO: 0.5 % (ref 0–1)
BUN SERPL-MCNC: 24 MG/DL (ref 7–24)
CHLORIDE SERPL-SCNC: 102 MMOL/L (ref 98–107)
CREAT SERPL-MCNC: 1.28 MG/DL (ref 0.7–1.3)
EOSINOPHIL # BLD AUTO: 0 10*3/UL (ref 0–0.4)
EOSINOPHIL # BLD AUTO: 0.5 % (ref 1–4)
ERYTHROCYTE [DISTWIDTH] IN BLOOD BY AUTOMATED COUNT: 16 % (ref 0–14.5)
HCT VFR BLD AUTO: 25.3 % (ref 42–52)
LYMPHOCYTES # BLD AUTO: 0.9 10*3/UL (ref 1.3–4.4)
LYMPHOCYTES NFR BLD AUTO: 11.7 % (ref 27–41)
MCH RBC QN AUTO: 29.4 PG (ref 27–31)
MCHC RBC AUTO-ENTMCNC: 31.2 G/DL (ref 33–37)
MCV RBC AUTO: 94.1 FL (ref 80–94)
MONOCYTES # BLD AUTO: 0.6 10*3/UL (ref 0.1–1)
MONOCYTES NFR BLD MANUAL: 7.5 % (ref 3–9)
NEUT #: 6.1 10*3/UL (ref 2.3–7.9)
NEUT %: 79.3 % (ref 47–73)
NRBC BLD QL AUTO: 0 10*3/UL (ref 0–0)
PLATELET # BLD AUTO: 153 10*3/UL (ref 130–400)
PMV BLD AUTO: 10.6 FL (ref 9.6–12.3)
POTASSIUM SERPL-SCNC: 5.1 MMOL/L (ref 3.5–5.1)
PROT SERPL-MCNC: 5.5 GM/DL (ref 6.4–8.2)
RBC # BLD AUTO: 2.69 10*6/UL (ref 4.5–5.9)
SODIUM SERPL-SCNC: 139 MMOL/L (ref 136–145)
WBC NRBC COR # BLD AUTO: 7.7 10*3/UL (ref 4.8–10.8)

## 2021-08-11 PROCEDURE — 0DB78ZX EXCISION OF STOMACH, PYLORUS, VIA NATURAL OR ARTIFICIAL OPENING ENDOSCOPIC, DIAGNOSTIC: ICD-10-PCS | Performed by: SURGERY

## 2021-08-12 VITALS — DIASTOLIC BLOOD PRESSURE: 42 MMHG

## 2021-08-12 VITALS — SYSTOLIC BLOOD PRESSURE: 90 MMHG | DIASTOLIC BLOOD PRESSURE: 40 MMHG

## 2021-08-12 VITALS — DIASTOLIC BLOOD PRESSURE: 36 MMHG

## 2021-08-12 VITALS — DIASTOLIC BLOOD PRESSURE: 62 MMHG

## 2021-08-12 VITALS — DIASTOLIC BLOOD PRESSURE: 50 MMHG

## 2021-08-12 VITALS — DIASTOLIC BLOOD PRESSURE: 54 MMHG

## 2021-08-12 VITALS — DIASTOLIC BLOOD PRESSURE: 58 MMHG

## 2021-08-12 VITALS — DIASTOLIC BLOOD PRESSURE: 40 MMHG | SYSTOLIC BLOOD PRESSURE: 78 MMHG

## 2021-08-12 VITALS — DIASTOLIC BLOOD PRESSURE: 64 MMHG

## 2021-08-12 VITALS — DIASTOLIC BLOOD PRESSURE: 51 MMHG

## 2021-08-12 VITALS — DIASTOLIC BLOOD PRESSURE: 48 MMHG

## 2021-08-12 LAB
BASOPHILS # BLD AUTO: 0 10*3/UL (ref 0–0.1)
BASOPHILS NFR BLD AUTO: 0.5 % (ref 0–1)
EOSINOPHIL # BLD AUTO: 0.2 10*3/UL (ref 0–0.4)
EOSINOPHIL # BLD AUTO: 2.8 % (ref 1–4)
ERYTHROCYTE [DISTWIDTH] IN BLOOD BY AUTOMATED COUNT: 15.7 % (ref 0–14.5)
ERYTHROCYTE [DISTWIDTH] IN BLOOD BY AUTOMATED COUNT: 16.1 % (ref 0–14.5)
HCT VFR BLD AUTO: 21.4 % (ref 42–52)
HCT VFR BLD AUTO: 26.4 % (ref 42–52)
HCT VFR BLD AUTO: 27.1 % (ref 42–52)
LYMPHOCYTES # BLD AUTO: 0.8 10*3/UL (ref 1.3–4.4)
LYMPHOCYTES NFR BLD AUTO: 14 % (ref 27–41)
MCH RBC QN AUTO: 29.2 PG (ref 27–31)
MCH RBC QN AUTO: 30.1 PG (ref 27–31)
MCHC RBC AUTO-ENTMCNC: 30.8 G/DL (ref 33–37)
MCHC RBC AUTO-ENTMCNC: 31.4 G/DL (ref 33–37)
MCV RBC AUTO: 94.7 FL (ref 80–94)
MCV RBC AUTO: 95.7 FL (ref 80–94)
MONOCYTES # BLD AUTO: 0.7 10*3/UL (ref 0.1–1)
MONOCYTES NFR BLD MANUAL: 11.5 % (ref 3–9)
NEUT #: 4.1 10*3/UL (ref 2.3–7.9)
NEUT %: 70.9 % (ref 47–73)
NRBC BLD QL AUTO: 0 % (ref 0–0)
NRBC BLD QL AUTO: 0 % (ref 0–0)
PLATELET # BLD AUTO: 127 10*3/UL (ref 130–400)
PLATELET # BLD AUTO: 141 10*3/UL (ref 130–400)
PLATELET SUFFICIENCY: NORMAL
PMV BLD AUTO: 10.7 FL (ref 9.6–12.3)
PMV BLD AUTO: 9.8 FL (ref 9.6–12.3)
RBC # BLD AUTO: 2.26 10*6/UL (ref 4.5–5.9)
RBC # BLD AUTO: 2.76 10*6/UL (ref 4.5–5.9)
RBC MORPH BLD: NORMAL
TOTAL CELLS COUNTED: 100 #CELLS
WBC NRBC COR # BLD AUTO: 5.7 10*3/UL (ref 4.8–10.8)
WBC NRBC COR # BLD AUTO: 7.8 10*3/UL (ref 4.8–10.8)

## 2021-08-12 PROCEDURE — 30233N1 TRANSFUSION OF NONAUTOLOGOUS RED BLOOD CELLS INTO PERIPHERAL VEIN, PERCUTANEOUS APPROACH: ICD-10-PCS | Performed by: INTERNAL MEDICINE

## 2021-08-13 VITALS — DIASTOLIC BLOOD PRESSURE: 60 MMHG

## 2021-08-13 VITALS — DIASTOLIC BLOOD PRESSURE: 60 MMHG | SYSTOLIC BLOOD PRESSURE: 110 MMHG

## 2021-08-13 VITALS — DIASTOLIC BLOOD PRESSURE: 59 MMHG

## 2021-08-13 LAB
BASOPHILS # BLD AUTO: 0 10*3/UL (ref 0–0.1)
BASOPHILS NFR BLD AUTO: 0.8 % (ref 0–1)
EOSINOPHIL # BLD AUTO: 0.3 10*3/UL (ref 0–0.4)
EOSINOPHIL # BLD AUTO: 4.9 % (ref 1–4)
ERYTHROCYTE [DISTWIDTH] IN BLOOD BY AUTOMATED COUNT: 15.6 % (ref 0–14.5)
HCT VFR BLD AUTO: 26.8 % (ref 42–52)
LYMPHOCYTES # BLD AUTO: 0.9 10*3/UL (ref 1.3–4.4)
LYMPHOCYTES NFR BLD AUTO: 17.8 % (ref 27–41)
MCH RBC QN AUTO: 30 PG (ref 27–31)
MCHC RBC AUTO-ENTMCNC: 31.3 G/DL (ref 33–37)
MCV RBC AUTO: 95.7 FL (ref 80–94)
MONOCYTES # BLD AUTO: 0.6 10*3/UL (ref 0.1–1)
MONOCYTES NFR BLD MANUAL: 10.9 % (ref 3–9)
NEUT #: 3.3 10*3/UL (ref 2.3–7.9)
NEUT %: 65.2 % (ref 47–73)
NRBC BLD QL AUTO: 0 % (ref 0–0)
PLATELET # BLD AUTO: 117 10*3/UL (ref 130–400)
PMV BLD AUTO: 10.9 FL (ref 9.6–12.3)
RBC # BLD AUTO: 2.8 10*6/UL (ref 4.5–5.9)
WBC NRBC COR # BLD AUTO: 5.1 10*3/UL (ref 4.8–10.8)

## 2021-10-02 ENCOUNTER — HOSPITAL ENCOUNTER (OUTPATIENT)
Dept: HOSPITAL 83 - LAB | Age: 86
End: 2021-10-02
Attending: FAMILY MEDICINE
Payer: MEDICARE

## 2021-10-02 DIAGNOSIS — R19.7: Primary | ICD-10-CM

## 2021-10-08 ENCOUNTER — HOSPITAL ENCOUNTER (INPATIENT)
Dept: HOSPITAL 83 - ED | Age: 86
LOS: 9 days | Discharge: HOSPICE | DRG: 871 | End: 2021-10-17
Attending: INTERNAL MEDICINE | Admitting: INTERNAL MEDICINE
Payer: MEDICARE

## 2021-10-08 VITALS — HEIGHT: 66.97 IN | BODY MASS INDEX: 26.24 KG/M2 | WEIGHT: 167.19 LBS

## 2021-10-08 VITALS — DIASTOLIC BLOOD PRESSURE: 66 MMHG

## 2021-10-08 VITALS — DIASTOLIC BLOOD PRESSURE: 49 MMHG

## 2021-10-08 VITALS — DIASTOLIC BLOOD PRESSURE: 48 MMHG

## 2021-10-08 DIAGNOSIS — N17.9: ICD-10-CM

## 2021-10-08 DIAGNOSIS — E83.51: ICD-10-CM

## 2021-10-08 DIAGNOSIS — Z53.9: ICD-10-CM

## 2021-10-08 DIAGNOSIS — E87.1: ICD-10-CM

## 2021-10-08 DIAGNOSIS — D64.9: ICD-10-CM

## 2021-10-08 DIAGNOSIS — E11.22: ICD-10-CM

## 2021-10-08 DIAGNOSIS — Z79.899: ICD-10-CM

## 2021-10-08 DIAGNOSIS — N18.30: ICD-10-CM

## 2021-10-08 DIAGNOSIS — I08.3: ICD-10-CM

## 2021-10-08 DIAGNOSIS — E11.65: ICD-10-CM

## 2021-10-08 DIAGNOSIS — J96.01: ICD-10-CM

## 2021-10-08 DIAGNOSIS — D50.9: ICD-10-CM

## 2021-10-08 DIAGNOSIS — I48.91: ICD-10-CM

## 2021-10-08 DIAGNOSIS — R74.01: ICD-10-CM

## 2021-10-08 DIAGNOSIS — Z82.49: ICD-10-CM

## 2021-10-08 DIAGNOSIS — I25.10: ICD-10-CM

## 2021-10-08 DIAGNOSIS — E87.8: ICD-10-CM

## 2021-10-08 DIAGNOSIS — Z79.1: ICD-10-CM

## 2021-10-08 DIAGNOSIS — E83.42: ICD-10-CM

## 2021-10-08 DIAGNOSIS — E44.1: ICD-10-CM

## 2021-10-08 DIAGNOSIS — I13.0: ICD-10-CM

## 2021-10-08 DIAGNOSIS — Z66: ICD-10-CM

## 2021-10-08 DIAGNOSIS — E78.5: ICD-10-CM

## 2021-10-08 DIAGNOSIS — Z51.5: ICD-10-CM

## 2021-10-08 DIAGNOSIS — E87.6: ICD-10-CM

## 2021-10-08 DIAGNOSIS — A41.9: Primary | ICD-10-CM

## 2021-10-08 DIAGNOSIS — I50.43: ICD-10-CM

## 2021-10-08 DIAGNOSIS — J18.9: ICD-10-CM

## 2021-10-08 DIAGNOSIS — Z79.82: ICD-10-CM

## 2021-10-08 DIAGNOSIS — I27.21: ICD-10-CM

## 2021-10-08 DIAGNOSIS — I25.5: ICD-10-CM

## 2021-10-08 LAB
ALBUMIN SERPL-MCNC: 3.2 GM/DL (ref 3.1–4.5)
ALP SERPL-CCNC: 44 U/L (ref 45–117)
ALT SERPL W P-5'-P-CCNC: 15 U/L (ref 12–78)
APTT PPP: 31.2 SECONDS (ref 20–32.1)
AST SERPL-CCNC: 9 IU/L (ref 3–35)
BACTERIA #/AREA URNS HPF: (no result) /[HPF]
BASOPHILS # BLD AUTO: 0 10*3/UL (ref 0–0.1)
BASOPHILS NFR BLD AUTO: 0.7 % (ref 0–1)
BUN SERPL-MCNC: 31 MG/DL (ref 7–24)
CHLORIDE SERPL-SCNC: 98 MMOL/L (ref 98–107)
CREAT SERPL-MCNC: 1.61 MG/DL (ref 0.7–1.3)
EOSINOPHIL # BLD AUTO: 0.1 10*3/UL (ref 0–0.4)
EOSINOPHIL # BLD AUTO: 0.9 % (ref 1–4)
EPI CELLS #/AREA URNS HPF: (no result) /[HPF]
ERYTHROCYTE [DISTWIDTH] IN BLOOD BY AUTOMATED COUNT: 18.3 % (ref 0–14.5)
HCT VFR BLD AUTO: 26.9 % (ref 42–52)
INR BLD: 1.4 (ref 2–3.5)
LIPASE SERPL-CCNC: 17 U/L (ref 73–393)
LYMPHOCYTES # BLD AUTO: 0.4 10*3/UL (ref 1.3–4.4)
LYMPHOCYTES NFR BLD AUTO: 6.9 % (ref 27–41)
MCH RBC QN AUTO: 24.2 PG (ref 27–31)
MCHC RBC AUTO-ENTMCNC: 30.1 G/DL (ref 33–37)
MCV RBC AUTO: 80.3 FL (ref 80–94)
MONOCYTES # BLD AUTO: 0.6 10*3/UL (ref 0.1–1)
MONOCYTES NFR BLD MANUAL: 10.7 % (ref 3–9)
NEUT #: 4.7 10*3/UL (ref 2.3–7.9)
NEUT %: 80.6 % (ref 47–73)
NRBC BLD QL AUTO: 0 10*3/UL (ref 0–0)
PH UR STRIP: 5 [PH] (ref 4.5–8)
PLATELET # BLD AUTO: 173 10*3/UL (ref 130–400)
PMV BLD AUTO: 10.8 FL (ref 9.6–12.3)
POTASSIUM SERPL-SCNC: 4.4 MMOL/L (ref 3.5–5.1)
PROT SERPL-MCNC: 6 GM/DL (ref 6.4–8.2)
RBC # BLD AUTO: 3.35 10*6/UL (ref 4.5–5.9)
RBC #/AREA URNS HPF: (no result) RBC/HPF (ref 0–2)
SODIUM SERPL-SCNC: 137 MMOL/L (ref 136–145)
SP GR UR: 1.01 (ref 1–1.03)
TROPONIN I SERPL-MCNC: 0.02 NG/ML (ref ?–0.04)
UROBILINOGEN UR STRIP-MCNC: 0.2 E.U./DL (ref 0–1)
WBC #/AREA URNS HPF: (no result) WBC/HPF (ref 0–5)
WBC NRBC COR # BLD AUTO: 5.8 10*3/UL (ref 4.8–10.8)

## 2021-10-09 VITALS — SYSTOLIC BLOOD PRESSURE: 84 MMHG | DIASTOLIC BLOOD PRESSURE: 34 MMHG

## 2021-10-09 VITALS — DIASTOLIC BLOOD PRESSURE: 68 MMHG

## 2021-10-09 VITALS — DIASTOLIC BLOOD PRESSURE: 44 MMHG | SYSTOLIC BLOOD PRESSURE: 86 MMHG

## 2021-10-09 VITALS — DIASTOLIC BLOOD PRESSURE: 53 MMHG | SYSTOLIC BLOOD PRESSURE: 88 MMHG

## 2021-10-09 VITALS — DIASTOLIC BLOOD PRESSURE: 48 MMHG

## 2021-10-09 VITALS — DIASTOLIC BLOOD PRESSURE: 56 MMHG

## 2021-10-09 VITALS — SYSTOLIC BLOOD PRESSURE: 93 MMHG | DIASTOLIC BLOOD PRESSURE: 49 MMHG

## 2021-10-09 VITALS — DIASTOLIC BLOOD PRESSURE: 59 MMHG | SYSTOLIC BLOOD PRESSURE: 104 MMHG

## 2021-10-09 VITALS — DIASTOLIC BLOOD PRESSURE: 49 MMHG | SYSTOLIC BLOOD PRESSURE: 85 MMHG

## 2021-10-09 LAB
BASOPHILS # BLD AUTO: 0 10*3/UL (ref 0–0.1)
BASOPHILS NFR BLD AUTO: 0.6 % (ref 0–1)
BUN SERPL-MCNC: 31 MG/DL (ref 7–24)
CHLORIDE SERPL-SCNC: 101 MMOL/L (ref 98–107)
CREAT SERPL-MCNC: 1.7 MG/DL (ref 0.7–1.3)
EOSINOPHIL # BLD AUTO: 0.1 10*3/UL (ref 0–0.4)
EOSINOPHIL # BLD AUTO: 2 % (ref 1–4)
ERYTHROCYTE [DISTWIDTH] IN BLOOD BY AUTOMATED COUNT: 18.4 % (ref 0–14.5)
HCT VFR BLD AUTO: 26.4 % (ref 42–52)
LYMPHOCYTES # BLD AUTO: 0.5 10*3/UL (ref 1.3–4.4)
LYMPHOCYTES NFR BLD AUTO: 10.2 % (ref 27–41)
MCH RBC QN AUTO: 24.2 PG (ref 27–31)
MCHC RBC AUTO-ENTMCNC: 29.5 G/DL (ref 33–37)
MCV RBC AUTO: 82 FL (ref 80–94)
MONOCYTES # BLD AUTO: 0.6 10*3/UL (ref 0.1–1)
MONOCYTES NFR BLD MANUAL: 11.5 % (ref 3–9)
NEUT #: 3.9 10*3/UL (ref 2.3–7.9)
NEUT %: 75.5 % (ref 47–73)
NRBC BLD QL AUTO: 0 10*3/UL (ref 0–0)
PLATELET # BLD AUTO: 157 10*3/UL (ref 130–400)
PMV BLD AUTO: 10.3 FL (ref 9.6–12.3)
POTASSIUM SERPL-SCNC: 4.4 MMOL/L (ref 3.5–5.1)
RBC # BLD AUTO: 3.22 10*6/UL (ref 4.5–5.9)
SODIUM SERPL-SCNC: 138 MMOL/L (ref 136–145)
WBC NRBC COR # BLD AUTO: 5.1 10*3/UL (ref 4.8–10.8)

## 2021-10-10 VITALS — SYSTOLIC BLOOD PRESSURE: 112 MMHG | DIASTOLIC BLOOD PRESSURE: 54 MMHG

## 2021-10-10 VITALS — SYSTOLIC BLOOD PRESSURE: 104 MMHG | DIASTOLIC BLOOD PRESSURE: 54 MMHG

## 2021-10-10 VITALS — SYSTOLIC BLOOD PRESSURE: 119 MMHG | DIASTOLIC BLOOD PRESSURE: 70 MMHG

## 2021-10-10 VITALS — SYSTOLIC BLOOD PRESSURE: 112 MMHG | DIASTOLIC BLOOD PRESSURE: 60 MMHG

## 2021-10-10 LAB
BASOPHILS # BLD AUTO: 0 10*3/UL (ref 0–0.1)
BASOPHILS NFR BLD AUTO: 0.6 % (ref 0–1)
BUN SERPL-MCNC: 28 MG/DL (ref 7–24)
CHLORIDE SERPL-SCNC: 96 MMOL/L (ref 98–107)
CREAT SERPL-MCNC: 1.49 MG/DL (ref 0.7–1.3)
EOSINOPHIL # BLD AUTO: 0.1 10*3/UL (ref 0–0.4)
EOSINOPHIL # BLD AUTO: 1.7 % (ref 1–4)
ERYTHROCYTE [DISTWIDTH] IN BLOOD BY AUTOMATED COUNT: 18.5 % (ref 0–14.5)
HCT VFR BLD AUTO: 28.3 % (ref 42–52)
LYMPHOCYTES # BLD AUTO: 0.5 10*3/UL (ref 1.3–4.4)
LYMPHOCYTES NFR BLD AUTO: 8.5 % (ref 27–41)
MCH RBC QN AUTO: 24.1 PG (ref 27–31)
MCHC RBC AUTO-ENTMCNC: 29.3 G/DL (ref 33–37)
MCV RBC AUTO: 82.3 FL (ref 80–94)
MONOCYTES # BLD AUTO: 0.8 10*3/UL (ref 0.1–1)
MONOCYTES NFR BLD MANUAL: 11.9 % (ref 3–9)
NEUT #: 4.9 10*3/UL (ref 2.3–7.9)
NEUT %: 77.1 % (ref 47–73)
NRBC BLD QL AUTO: 0 10*3/UL (ref 0–0)
PLATELET # BLD AUTO: 168 10*3/UL (ref 130–400)
PMV BLD AUTO: 10.3 FL (ref 9.6–12.3)
POTASSIUM SERPL-SCNC: 3.8 MMOL/L (ref 3.5–5.1)
RBC # BLD AUTO: 3.44 10*6/UL (ref 4.5–5.9)
SODIUM SERPL-SCNC: 136 MMOL/L (ref 136–145)
WBC NRBC COR # BLD AUTO: 6.4 10*3/UL (ref 4.8–10.8)

## 2021-10-11 VITALS — DIASTOLIC BLOOD PRESSURE: 65 MMHG

## 2021-10-11 VITALS — DIASTOLIC BLOOD PRESSURE: 62 MMHG

## 2021-10-11 VITALS — DIASTOLIC BLOOD PRESSURE: 50 MMHG

## 2021-10-11 LAB
BASOPHILS # BLD AUTO: 0 10*3/UL (ref 0–0.1)
BASOPHILS NFR BLD AUTO: 0.7 % (ref 0–1)
BUN SERPL-MCNC: 27 MG/DL (ref 7–24)
CHLORIDE SERPL-SCNC: 94 MMOL/L (ref 98–107)
CREAT SERPL-MCNC: 1.58 MG/DL (ref 0.7–1.3)
EOSINOPHIL # BLD AUTO: 0.1 10*3/UL (ref 0–0.4)
EOSINOPHIL # BLD AUTO: 1.7 % (ref 1–4)
ERYTHROCYTE [DISTWIDTH] IN BLOOD BY AUTOMATED COUNT: 18.4 % (ref 0–14.5)
HCT VFR BLD AUTO: 28.1 % (ref 42–52)
LYMPHOCYTES # BLD AUTO: 0.6 10*3/UL (ref 1.3–4.4)
LYMPHOCYTES NFR BLD AUTO: 10.3 % (ref 27–41)
MCH RBC QN AUTO: 24.1 PG (ref 27–31)
MCHC RBC AUTO-ENTMCNC: 29.5 G/DL (ref 33–37)
MCV RBC AUTO: 81.7 FL (ref 80–94)
MONOCYTES # BLD AUTO: 0.7 10*3/UL (ref 0.1–1)
MONOCYTES NFR BLD MANUAL: 11.3 % (ref 3–9)
NEUT #: 4.4 10*3/UL (ref 2.3–7.9)
NEUT %: 75.7 % (ref 47–73)
NRBC BLD QL AUTO: 0 10*3/UL (ref 0–0)
PLATELET # BLD AUTO: 177 10*3/UL (ref 130–400)
PMV BLD AUTO: 10.7 FL (ref 9.6–12.3)
POTASSIUM SERPL-SCNC: 3.4 MMOL/L (ref 3.5–5.1)
RBC # BLD AUTO: 3.44 10*6/UL (ref 4.5–5.9)
SODIUM SERPL-SCNC: 135 MMOL/L (ref 136–145)
WBC NRBC COR # BLD AUTO: 5.8 10*3/UL (ref 4.8–10.8)

## 2021-10-12 VITALS — DIASTOLIC BLOOD PRESSURE: 55 MMHG | SYSTOLIC BLOOD PRESSURE: 113 MMHG

## 2021-10-12 VITALS — DIASTOLIC BLOOD PRESSURE: 72 MMHG | SYSTOLIC BLOOD PRESSURE: 96 MMHG

## 2021-10-12 VITALS — DIASTOLIC BLOOD PRESSURE: 61 MMHG

## 2021-10-12 VITALS — DIASTOLIC BLOOD PRESSURE: 52 MMHG

## 2021-10-12 VITALS — DIASTOLIC BLOOD PRESSURE: 84 MMHG | SYSTOLIC BLOOD PRESSURE: 131 MMHG

## 2021-10-12 LAB
ALBUMIN SERPL-MCNC: 3.3 GM/DL (ref 3.1–4.5)
ALP SERPL-CCNC: 47 U/L (ref 45–117)
ALT SERPL W P-5'-P-CCNC: 19 U/L (ref 12–78)
AST SERPL-CCNC: 12 IU/L (ref 3–35)
BASOPHILS # BLD AUTO: 0.1 10*3/UL (ref 0–0.1)
BASOPHILS NFR BLD AUTO: 0.7 % (ref 0–1)
BUN SERPL-MCNC: 28 MG/DL (ref 7–24)
CHLORIDE SERPL-SCNC: 93 MMOL/L (ref 98–107)
CREAT SERPL-MCNC: 1.56 MG/DL (ref 0.7–1.3)
EOSINOPHIL # BLD AUTO: 0 10*3/UL (ref 0–0.4)
EOSINOPHIL # BLD AUTO: 0.4 % (ref 1–4)
ERYTHROCYTE [DISTWIDTH] IN BLOOD BY AUTOMATED COUNT: 18.6 % (ref 0–14.5)
HCT VFR BLD AUTO: 30 % (ref 42–52)
LYMPHOCYTES # BLD AUTO: 0.3 10*3/UL (ref 1.3–4.4)
LYMPHOCYTES NFR BLD AUTO: 4.7 % (ref 27–41)
MAGNESIUM: 1.4 MG/DL (ref 1.5–2.1)
MCH RBC QN AUTO: 23.5 PG (ref 27–31)
MCHC RBC AUTO-ENTMCNC: 29.3 G/DL (ref 33–37)
MCV RBC AUTO: 80.2 FL (ref 80–94)
MONOCYTES # BLD AUTO: 0.8 10*3/UL (ref 0.1–1)
MONOCYTES NFR BLD MANUAL: 11.1 % (ref 3–9)
NEUT #: 5.8 10*3/UL (ref 2.3–7.9)
NEUT %: 82.8 % (ref 47–73)
NRBC BLD QL AUTO: 0 % (ref 0–0)
PLATELET # BLD AUTO: 207 10*3/UL (ref 130–400)
PMV BLD AUTO: 10.3 FL (ref 9.6–12.3)
POTASSIUM SERPL-SCNC: 3.3 MMOL/L (ref 3.5–5.1)
PRO BNP, N-TERMINAL: ABNORMAL PG/ML (ref 0–450)
PROT SERPL-MCNC: 6.5 GM/DL (ref 6.4–8.2)
RBC # BLD AUTO: 3.74 10*6/UL (ref 4.5–5.9)
SODIUM SERPL-SCNC: 136 MMOL/L (ref 136–145)
WBC NRBC COR # BLD AUTO: 7 10*3/UL (ref 4.8–10.8)

## 2021-10-13 VITALS — DIASTOLIC BLOOD PRESSURE: 51 MMHG | SYSTOLIC BLOOD PRESSURE: 95 MMHG

## 2021-10-13 VITALS — DIASTOLIC BLOOD PRESSURE: 48 MMHG | SYSTOLIC BLOOD PRESSURE: 90 MMHG

## 2021-10-13 VITALS — DIASTOLIC BLOOD PRESSURE: 60 MMHG

## 2021-10-13 VITALS — DIASTOLIC BLOOD PRESSURE: 68 MMHG

## 2021-10-13 VITALS — DIASTOLIC BLOOD PRESSURE: 87 MMHG

## 2021-10-13 LAB
BASOPHILS # BLD AUTO: 0 10*3/UL (ref 0–0.1)
BASOPHILS NFR BLD AUTO: 0.5 % (ref 0–1)
BUN SERPL-MCNC: 37 MG/DL (ref 7–24)
CHLORIDE SERPL-SCNC: 94 MMOL/L (ref 98–107)
CREAT SERPL-MCNC: 1.82 MG/DL (ref 0.7–1.3)
EOSINOPHIL # BLD AUTO: 0.1 10*3/UL (ref 0–0.4)
EOSINOPHIL # BLD AUTO: 0.9 % (ref 1–4)
ERYTHROCYTE [DISTWIDTH] IN BLOOD BY AUTOMATED COUNT: 18.6 % (ref 0–14.5)
HCT VFR BLD AUTO: 30.3 % (ref 42–52)
LYMPHOCYTES # BLD AUTO: 0.4 10*3/UL (ref 1.3–4.4)
LYMPHOCYTES NFR BLD AUTO: 5.7 % (ref 27–41)
MCH RBC QN AUTO: 23.7 PG (ref 27–31)
MCHC RBC AUTO-ENTMCNC: 29.7 G/DL (ref 33–37)
MCV RBC AUTO: 79.9 FL (ref 80–94)
MONOCYTES # BLD AUTO: 0.8 10*3/UL (ref 0.1–1)
MONOCYTES NFR BLD MANUAL: 11.5 % (ref 3–9)
NEUT #: 5.3 10*3/UL (ref 2.3–7.9)
NEUT %: 80.9 % (ref 47–73)
NRBC BLD QL AUTO: 0 % (ref 0–0)
PLATELET # BLD AUTO: 197 10*3/UL (ref 130–400)
PMV BLD AUTO: 9.9 FL (ref 9.6–12.3)
POTASSIUM SERPL-SCNC: 4.4 MMOL/L (ref 3.5–5.1)
RBC # BLD AUTO: 3.79 10*6/UL (ref 4.5–5.9)
SODIUM SERPL-SCNC: 136 MMOL/L (ref 136–145)
WBC NRBC COR # BLD AUTO: 6.5 10*3/UL (ref 4.8–10.8)

## 2021-10-14 VITALS — SYSTOLIC BLOOD PRESSURE: 117 MMHG | DIASTOLIC BLOOD PRESSURE: 86 MMHG

## 2021-10-14 VITALS — DIASTOLIC BLOOD PRESSURE: 74 MMHG

## 2021-10-14 VITALS — SYSTOLIC BLOOD PRESSURE: 96 MMHG | DIASTOLIC BLOOD PRESSURE: 55 MMHG

## 2021-10-14 VITALS — DIASTOLIC BLOOD PRESSURE: 78 MMHG

## 2021-10-14 VITALS — DIASTOLIC BLOOD PRESSURE: 66 MMHG | SYSTOLIC BLOOD PRESSURE: 96 MMHG

## 2021-10-14 LAB
ALBUMIN SERPL-MCNC: 3.2 GM/DL (ref 3.1–4.5)
ALP SERPL-CCNC: 68 U/L (ref 45–117)
ALT SERPL W P-5'-P-CCNC: 137 U/L (ref 12–78)
AST SERPL-CCNC: 170 IU/L (ref 3–35)
BASOPHILS # BLD AUTO: 0 10*3/UL (ref 0–0.1)
BASOPHILS NFR BLD AUTO: 0.6 % (ref 0–1)
BUN SERPL-MCNC: 50 MG/DL (ref 7–24)
CHLORIDE SERPL-SCNC: 94 MMOL/L (ref 98–107)
CREAT SERPL-MCNC: 1.98 MG/DL (ref 0.7–1.3)
EOSINOPHIL # BLD AUTO: 0.1 10*3/UL (ref 0–0.4)
EOSINOPHIL # BLD AUTO: 0.8 % (ref 1–4)
ERYTHROCYTE [DISTWIDTH] IN BLOOD BY AUTOMATED COUNT: 18.7 % (ref 0–14.5)
HCT VFR BLD AUTO: 32.2 % (ref 42–52)
LYMPHOCYTES # BLD AUTO: 0.4 10*3/UL (ref 1.3–4.4)
LYMPHOCYTES NFR BLD AUTO: 5.8 % (ref 27–41)
MCH RBC QN AUTO: 23.8 PG (ref 27–31)
MCHC RBC AUTO-ENTMCNC: 29.8 G/DL (ref 33–37)
MCV RBC AUTO: 79.9 FL (ref 80–94)
MONOCYTES # BLD AUTO: 0.7 10*3/UL (ref 0.1–1)
MONOCYTES NFR BLD MANUAL: 10.2 % (ref 3–9)
NEUT #: 5.8 10*3/UL (ref 2.3–7.9)
NEUT %: 82.3 % (ref 47–73)
NRBC BLD QL AUTO: 0 % (ref 0–0)
PLATELET # BLD AUTO: 227 10*3/UL (ref 130–400)
PMV BLD AUTO: 10.6 FL (ref 9.6–12.3)
POTASSIUM SERPL-SCNC: 4.5 MMOL/L (ref 3.5–5.1)
PROT SERPL-MCNC: 6.4 GM/DL (ref 6.4–8.2)
RBC # BLD AUTO: 4.03 10*6/UL (ref 4.5–5.9)
SODIUM SERPL-SCNC: 134 MMOL/L (ref 136–145)
WBC NRBC COR # BLD AUTO: 7.1 10*3/UL (ref 4.8–10.8)

## 2021-10-15 VITALS — DIASTOLIC BLOOD PRESSURE: 71 MMHG

## 2021-10-15 VITALS — DIASTOLIC BLOOD PRESSURE: 88 MMHG | SYSTOLIC BLOOD PRESSURE: 122 MMHG

## 2021-10-15 VITALS — DIASTOLIC BLOOD PRESSURE: 54 MMHG | SYSTOLIC BLOOD PRESSURE: 96 MMHG

## 2021-10-15 VITALS — DIASTOLIC BLOOD PRESSURE: 63 MMHG

## 2021-10-15 VITALS — DIASTOLIC BLOOD PRESSURE: 89 MMHG

## 2021-10-15 LAB
ALBUMIN SERPL-MCNC: 3.1 GM/DL (ref 3.1–4.5)
ALP SERPL-CCNC: 55 U/L (ref 45–117)
ALT SERPL W P-5'-P-CCNC: 360 U/L (ref 12–78)
AST SERPL-CCNC: 390 IU/L (ref 3–35)
BASOPHILS # BLD AUTO: 0 10*3/UL (ref 0–0.1)
BASOPHILS NFR BLD AUTO: 0.4 % (ref 0–1)
BUN SERPL-MCNC: 56 MG/DL (ref 7–24)
CHLORIDE SERPL-SCNC: 94 MMOL/L (ref 98–107)
CREAT SERPL-MCNC: 1.99 MG/DL (ref 0.7–1.3)
EOSINOPHIL # BLD AUTO: 0 10*3/UL (ref 0–0.4)
EOSINOPHIL # BLD AUTO: 0.4 % (ref 1–4)
ERYTHROCYTE [DISTWIDTH] IN BLOOD BY AUTOMATED COUNT: 19 % (ref 0–14.5)
HCT VFR BLD AUTO: 31.2 % (ref 42–52)
LYMPHOCYTES # BLD AUTO: 0.5 10*3/UL (ref 1.3–4.4)
LYMPHOCYTES NFR BLD AUTO: 6.5 % (ref 27–41)
MCH RBC QN AUTO: 23.9 PG (ref 27–31)
MCHC RBC AUTO-ENTMCNC: 30.1 G/DL (ref 33–37)
MCV RBC AUTO: 79.4 FL (ref 80–94)
MONOCYTES # BLD AUTO: 1 10*3/UL (ref 0.1–1)
MONOCYTES NFR BLD MANUAL: 12.5 % (ref 3–9)
NEUT #: 6.1 10*3/UL (ref 2.3–7.9)
NEUT %: 79.8 % (ref 47–73)
NRBC BLD QL AUTO: 0 % (ref 0–0)
PLATELET # BLD AUTO: 217 10*3/UL (ref 130–400)
PMV BLD AUTO: 10.6 FL (ref 9.6–12.3)
POTASSIUM SERPL-SCNC: 4.4 MMOL/L (ref 3.5–5.1)
PROT SERPL-MCNC: 6.1 GM/DL (ref 6.4–8.2)
RBC # BLD AUTO: 3.93 10*6/UL (ref 4.5–5.9)
SODIUM SERPL-SCNC: 134 MMOL/L (ref 136–145)
WBC NRBC COR # BLD AUTO: 7.6 10*3/UL (ref 4.8–10.8)

## 2021-10-16 VITALS — DIASTOLIC BLOOD PRESSURE: 55 MMHG

## 2021-10-16 VITALS — SYSTOLIC BLOOD PRESSURE: 94 MMHG | DIASTOLIC BLOOD PRESSURE: 60 MMHG

## 2021-10-16 VITALS — DIASTOLIC BLOOD PRESSURE: 82 MMHG

## 2021-10-16 VITALS — DIASTOLIC BLOOD PRESSURE: 61 MMHG

## 2021-10-16 VITALS — DIASTOLIC BLOOD PRESSURE: 76 MMHG | SYSTOLIC BLOOD PRESSURE: 110 MMHG

## 2021-10-16 VITALS — DIASTOLIC BLOOD PRESSURE: 29 MMHG

## 2021-10-16 LAB
ALBUMIN SERPL-MCNC: 3.3 GM/DL (ref 3.1–4.5)
ALP SERPL-CCNC: 69 U/L (ref 45–117)
ALT SERPL W P-5'-P-CCNC: 834 U/L (ref 12–78)
AST SERPL-CCNC: 933 IU/L (ref 3–35)
BASOPHILS # BLD AUTO: 0 10*3/UL (ref 0–0.1)
BASOPHILS NFR BLD AUTO: 0 % (ref 0–1)
BUN SERPL-MCNC: 69 MG/DL (ref 7–24)
CHLORIDE SERPL-SCNC: 91 MMOL/L (ref 98–107)
CREAT SERPL-MCNC: 2.25 MG/DL (ref 0.7–1.3)
EOSINOPHIL # BLD AUTO: 0 % (ref 1–4)
EOSINOPHIL # BLD AUTO: 0 10*3/UL (ref 0–0.4)
ERYTHROCYTE [DISTWIDTH] IN BLOOD BY AUTOMATED COUNT: 19 % (ref 0–14.5)
HCT VFR BLD AUTO: 33.4 % (ref 42–52)
LYMPHOCYTES # BLD AUTO: 0.2 10*3/UL (ref 1.3–4.4)
LYMPHOCYTES NFR BLD AUTO: 2.5 % (ref 27–41)
MCH RBC QN AUTO: 23.6 PG (ref 27–31)
MCHC RBC AUTO-ENTMCNC: 29.9 G/DL (ref 33–37)
MCV RBC AUTO: 79 FL (ref 80–94)
MONOCYTES # BLD AUTO: 0.7 10*3/UL (ref 0.1–1)
MONOCYTES NFR BLD MANUAL: 9.6 % (ref 3–9)
NEUT #: 6.8 10*3/UL (ref 2.3–7.9)
NEUT %: 87.5 % (ref 47–73)
NRBC BLD QL AUTO: 0 % (ref 0–0)
PLATELET # BLD AUTO: 203 10*3/UL (ref 130–400)
PMV BLD AUTO: 11.2 FL (ref 9.6–12.3)
POTASSIUM SERPL-SCNC: 5.3 MMOL/L (ref 3.5–5.1)
PROT SERPL-MCNC: 6.3 GM/DL (ref 6.4–8.2)
RBC # BLD AUTO: 4.23 10*6/UL (ref 4.5–5.9)
SODIUM SERPL-SCNC: 131 MMOL/L (ref 136–145)
WBC NRBC COR # BLD AUTO: 7.7 10*3/UL (ref 4.8–10.8)

## 2021-10-17 ENCOUNTER — HOSPITAL ENCOUNTER (INPATIENT)
Dept: HOSPITAL 83 - 4E | Age: 86
LOS: 2 days | DRG: 291 | End: 2021-10-19
Attending: INTERNAL MEDICINE | Admitting: INTERNAL MEDICINE
Payer: COMMERCIAL

## 2021-10-17 VITALS — BODY MASS INDEX: 26.7 KG/M2 | HEIGHT: 67 IN | WEIGHT: 170.13 LBS

## 2021-10-17 VITALS — DIASTOLIC BLOOD PRESSURE: 59 MMHG

## 2021-10-17 VITALS — DIASTOLIC BLOOD PRESSURE: 78 MMHG | SYSTOLIC BLOOD PRESSURE: 115 MMHG

## 2021-10-17 VITALS — DIASTOLIC BLOOD PRESSURE: 65 MMHG

## 2021-10-17 VITALS — DIASTOLIC BLOOD PRESSURE: 50 MMHG

## 2021-10-17 DIAGNOSIS — J96.01: ICD-10-CM

## 2021-10-17 DIAGNOSIS — I50.23: Primary | ICD-10-CM

## 2021-10-17 DIAGNOSIS — Z51.5: ICD-10-CM

## 2021-10-17 LAB
ALBUMIN SERPL-MCNC: 3 GM/DL (ref 3.1–4.5)
ALP SERPL-CCNC: 62 U/L (ref 45–117)
ALT SERPL W P-5'-P-CCNC: 983 U/L (ref 12–78)
AST SERPL-CCNC: 808 IU/L (ref 3–35)
BASOPHILS # BLD AUTO: 0 10*3/UL (ref 0–0.1)
BASOPHILS NFR BLD AUTO: 0 % (ref 0–1)
BUN SERPL-MCNC: 83 MG/DL (ref 7–24)
CHLORIDE SERPL-SCNC: 91 MMOL/L (ref 98–107)
CREAT SERPL-MCNC: 2.15 MG/DL (ref 0.7–1.3)
EOSINOPHIL # BLD AUTO: 0 % (ref 1–4)
EOSINOPHIL # BLD AUTO: 0 10*3/UL (ref 0–0.4)
ERYTHROCYTE [DISTWIDTH] IN BLOOD BY AUTOMATED COUNT: 18.9 % (ref 0–14.5)
HCT VFR BLD AUTO: 29.9 % (ref 42–52)
LYMPHOCYTES # BLD AUTO: 0.2 10*3/UL (ref 1.3–4.4)
LYMPHOCYTES NFR BLD AUTO: 2.3 % (ref 27–41)
MCH RBC QN AUTO: 23.5 PG (ref 27–31)
MCHC RBC AUTO-ENTMCNC: 30.1 G/DL (ref 33–37)
MCV RBC AUTO: 78.1 FL (ref 80–94)
MONOCYTES # BLD AUTO: 0.8 10*3/UL (ref 0.1–1)
MONOCYTES NFR BLD MANUAL: 10.2 % (ref 3–9)
NEUT #: 6.4 10*3/UL (ref 2.3–7.9)
NEUT %: 87 % (ref 47–73)
NRBC BLD QL AUTO: 0 10*3/UL (ref 0–0)
PLATELET # BLD AUTO: 173 10*3/UL (ref 130–400)
PMV BLD AUTO: 11.4 FL (ref 9.6–12.3)
POTASSIUM SERPL-SCNC: 4.4 MMOL/L (ref 3.5–5.1)
PROT SERPL-MCNC: 5.8 GM/DL (ref 6.4–8.2)
RBC # BLD AUTO: 3.83 10*6/UL (ref 4.5–5.9)
SODIUM SERPL-SCNC: 133 MMOL/L (ref 136–145)
WBC NRBC COR # BLD AUTO: 7.3 10*3/UL (ref 4.8–10.8)

## 2021-10-18 VITALS — DIASTOLIC BLOOD PRESSURE: 61 MMHG

## 2021-10-18 VITALS — DIASTOLIC BLOOD PRESSURE: 96 MMHG | SYSTOLIC BLOOD PRESSURE: 121 MMHG

## 2021-10-18 VITALS — SYSTOLIC BLOOD PRESSURE: 68 MMHG | DIASTOLIC BLOOD PRESSURE: 53 MMHG

## 2024-04-12 NOTE — PROGRESS NOTES
CHIEF COMPLAINT: CAD/CMP    HISTORY OF PRESENT ILLNESS: Patient is a 80 y.o. male seen at the request of Sahra Mcfarlane DO. Some CP/angina. Some SOB. Volume markedly improved. Medical history includes:   1. Essential hypertension. 2. Hyperlipidemia. 3. Type II diabetes mellitus. 4. Hiatal hernia. 5. Coronary artery disease, status post KARIME to the LAD and OM - 01/23/2006. 6. Cardiac cath, 2009. Mild-moderate CAD, but no high grade stenoses. 7. Stress MPS, 08/2012. Normal perfusion. EF 62%. 8. Echo, 03/25/2015. Normal LV size with LV systolic function at the lower limits of normal.  RV size and function normal.  Trace mitral insufficiency. Aortic sclerosis with mild aortic stenosis. Mean gradient 7 mmHg. 9. Lexiscan MPS 12/02/2015. No evidence for ischemia. EF 78%. No transient cavity dilatation. Low-risk study. Past Medical History:   Diagnosis Date    Arthritic-like pain     CAD (coronary artery disease)     Chest discomfort     Diabetes mellitus ([address]     Enlarged prostate     Exposure to chemical compounds     Hyperlipidemia     Hypertension     Kidney stones     Wears glasses        Patient Active Problem List   Diagnosis    Coronary artery disease involving native heart    Essential hypertension    Mixed hyperlipidemia    Aortic valve sclerosis       No Known Allergies    Current Outpatient Medications   Medication Sig Dispense Refill    polyethylene glycol (GLYCOLAX) 17 g packet Take 17 g by mouth daily as needed for Constipation      diphenhydrAMINE HCl (BENADRYL ALLERGY PO) Take 20 mg by mouth daily      HYDROcodone-acetaminophen (NORCO) 5-325 MG per tablet Take 1 tablet by mouth every 6 hours as needed for Pain.       amiodarone (CORDARONE) 200 MG tablet Take 200 mg by mouth daily      Melatonin 10-10 MG TBCR Take by mouth      mirtazapine (REMERON) 15 MG tablet Take 15 mg by mouth nightly      potassium chloride (KLOR-CON M) 20 MEQ TBCR extended Called patient to schedule appointment. Left message to call with gentleman who answered the phone to have her call.    release tablet Take 20 mEq by mouth daily (with breakfast)      Pantoprazole Sodium (PROTONIX PO) Take 40 mg by mouth      Alpha-Lipoic Acid 600 MG CAPS Take by mouth      furosemide (LASIX) 40 MG tablet Take 1 tablet by mouth daily      losartan (COZAAR) 25 MG tablet Take 1 tablet by mouth daily      metoprolol succinate (TOPROL XL) 50 MG extended release tablet Take 1 tablet by mouth daily      spironolactone (ALDACTONE) 25 MG tablet Take 1 tablet by mouth daily      aspirin 81 MG tablet Take 81 mg by mouth daily      clopidogrel (PLAVIX) 75 MG tablet Take 75 mg by mouth daily      metFORMIN (GLUCOPHAGE) 500 MG tablet Take 500 mg by mouth 3 times daily       doxazosin (CARDURA) 4 MG tablet Take 4 mg by mouth nightly      isosorbide mononitrate (IMDUR) 30 MG CR tablet Take 30 mg by mouth daily       No current facility-administered medications for this visit. Social History     Socioeconomic History    Marital status:      Spouse name: Not on file    Number of children: Not on file    Years of education: Not on file    Highest education level: Not on file   Occupational History    Not on file   Tobacco Use    Smoking status: Never Smoker    Smokeless tobacco: Never Used   Vaping Use    Vaping Use: Never used   Substance and Sexual Activity    Alcohol use: No    Drug use: No    Sexual activity: Not on file   Other Topics Concern    Not on file   Social History Narrative    Not on file     Social Determinants of Health     Financial Resource Strain:     Difficulty of Paying Living Expenses:    Food Insecurity:     Worried About Running Out of Food in the Last Year:     [address] in the Last Year:    Transportation Needs:     Lack of Transportation (Medical):      Lack of Transportation (Non-Medical):    Physical Activity:     Days of Exercise per Week:     Minutes of Exercise per Session:    Stress:     Feeling of Stress :    Social Connections:     Frequency of oriented to person, place, and time. Skin: Skin is warm and dry. No rash noted. Not diaphoretic. No erythema. Psychiatric: Normal mood and affect. Behavior is normal.     EKG:  normal sinus rhythm, nonspecific ST and T waves changes, RBBB. Echo Conclusion 4/27/2021:        LVEF is 15-20%.      There is global hypokinesis of the left ventricle.        Left ventricle is mildly dilated.        Grade II - pseudonormal filling dynamics.      The right ventricular systolic function is normal.        Left atrium is mildly dilated.        Moderate mitral regurgitation.           Mild to moderate tricuspid regurgitation.        Mild pulmonary hypertension. The RVSP is 47 mmHg. Stress Conclusion 4/30/2021:        Abnormal study.  Scintigraphic evidence for moderate-sized, medium         intensity fixed defect of the inferior wall consistent with scar.           Abnormal study.  Scintigraphic evidence for moderate-sized, medium         intensity fixed defect of the inferior wall consistent with scar. ASSESSMENT AND PLAN:  Patient Active Problem List   Diagnosis    Coronary artery disease involving native heart    Essential hypertension    Mixed hyperlipidemia    Aortic valve sclerosis     1. Acute systolic CHF:    BB/ARB/nitrate/aldactone/lasix. 2. CAD: Stress with scar only. Despite this patient having some CP and with new severe LV dysfunction. Arrange cath. AUC 7 with CHF as indication. ASA/BB/plavix/statin/nitrate. 3. NSVT: Amio/BB. 4. HTN: Observe. 5. Lipids: Statin. 6. DM    Stacy Alvarado D.O.   Cardiologist  Cardiology, [id number] [facility]